# Patient Record
Sex: FEMALE | Race: WHITE | NOT HISPANIC OR LATINO | Employment: OTHER | URBAN - METROPOLITAN AREA
[De-identification: names, ages, dates, MRNs, and addresses within clinical notes are randomized per-mention and may not be internally consistent; named-entity substitution may affect disease eponyms.]

---

## 2018-03-07 ENCOUNTER — APPOINTMENT (OUTPATIENT)
Dept: PREADMISSION TESTING | Facility: HOSPITAL | Age: 68
End: 2018-03-07

## 2018-03-07 ENCOUNTER — PREP FOR SURGERY (OUTPATIENT)
Dept: OTHER | Facility: HOSPITAL | Age: 68
End: 2018-03-07

## 2018-03-07 VITALS — WEIGHT: 147 LBS | BODY MASS INDEX: 21.77 KG/M2 | HEIGHT: 69 IN

## 2018-03-07 DIAGNOSIS — N83.202 CYST OF LEFT OVARY: Primary | ICD-10-CM

## 2018-03-07 DIAGNOSIS — N83.202 CYST OF LEFT OVARY: ICD-10-CM

## 2018-03-07 LAB
ABO GROUP BLD: NORMAL
BLD GP AB SCN SERPL QL: NEGATIVE
RH BLD: POSITIVE

## 2018-03-07 PROCEDURE — 36415 COLL VENOUS BLD VENIPUNCTURE: CPT

## 2018-03-07 PROCEDURE — 86901 BLOOD TYPING SEROLOGIC RH(D): CPT | Performed by: OBSTETRICS & GYNECOLOGY

## 2018-03-07 PROCEDURE — 86850 RBC ANTIBODY SCREEN: CPT | Performed by: OBSTETRICS & GYNECOLOGY

## 2018-03-07 PROCEDURE — 86900 BLOOD TYPING SEROLOGIC ABO: CPT | Performed by: OBSTETRICS & GYNECOLOGY

## 2018-03-07 RX ORDER — IBUPROFEN 200 MG
600 TABLET ORAL EVERY 6 HOURS PRN
Status: ON HOLD | COMMUNITY
End: 2018-03-09

## 2018-03-07 RX ORDER — SODIUM CHLORIDE 0.9 % (FLUSH) 0.9 %
1-10 SYRINGE (ML) INJECTION AS NEEDED
Status: CANCELLED | OUTPATIENT
Start: 2018-03-07

## 2018-03-07 RX ORDER — ATORVASTATIN CALCIUM 10 MG/1
10 TABLET, FILM COATED ORAL NIGHTLY
COMMUNITY
End: 2018-08-29 | Stop reason: SDUPTHER

## 2018-03-08 ENCOUNTER — ANESTHESIA (OUTPATIENT)
Dept: PERIOP | Facility: HOSPITAL | Age: 68
End: 2018-03-08

## 2018-03-08 ENCOUNTER — ANESTHESIA EVENT (OUTPATIENT)
Dept: PERIOP | Facility: HOSPITAL | Age: 68
End: 2018-03-08

## 2018-03-08 ENCOUNTER — HOSPITAL ENCOUNTER (OUTPATIENT)
Facility: HOSPITAL | Age: 68
Discharge: HOME | End: 2018-03-09
Attending: OBSTETRICS & GYNECOLOGY | Admitting: OBSTETRICS & GYNECOLOGY

## 2018-03-08 DIAGNOSIS — N83.202 CYST OF LEFT OVARY: ICD-10-CM

## 2018-03-08 DIAGNOSIS — N94.89 ADNEXAL MASS: ICD-10-CM

## 2018-03-08 PROBLEM — N83.209 OVARIAN CYST: Status: ACTIVE | Noted: 2018-03-08

## 2018-03-08 LAB
ABO GROUP BLD: NORMAL
DEPRECATED RDW RBC AUTO: 45.4 FL (ref 37–54)
ERYTHROCYTE [DISTWIDTH] IN BLOOD BY AUTOMATED COUNT: 12.8 % (ref 11.3–14.5)
HCT VFR BLD AUTO: 40.1 % (ref 34.5–44)
HGB BLD-MCNC: 13 G/DL (ref 11.5–15.5)
MCH RBC QN AUTO: 31.4 PG (ref 27–31)
MCHC RBC AUTO-ENTMCNC: 32.4 G/DL (ref 32–36)
MCV RBC AUTO: 96.9 FL (ref 80–99)
PLATELET # BLD AUTO: 199 10*3/MM3 (ref 150–450)
PMV BLD AUTO: 9.4 FL (ref 6–12)
RBC # BLD AUTO: 4.14 10*6/MM3 (ref 3.89–5.14)
RH BLD: POSITIVE
WBC NRBC COR # BLD: 8.2 10*3/MM3 (ref 3.5–10.8)

## 2018-03-08 PROCEDURE — 25010000002 ONDANSETRON PER 1 MG: Performed by: NURSE ANESTHETIST, CERTIFIED REGISTERED

## 2018-03-08 PROCEDURE — 85027 COMPLETE CBC AUTOMATED: CPT | Performed by: OBSTETRICS & GYNECOLOGY

## 2018-03-08 PROCEDURE — A9270 NON-COVERED ITEM OR SERVICE: HCPCS | Performed by: OBSTETRICS & GYNECOLOGY

## 2018-03-08 PROCEDURE — A9270 NON-COVERED ITEM OR SERVICE: HCPCS | Performed by: ANESTHESIOLOGY

## 2018-03-08 PROCEDURE — 25010000002 ENOXAPARIN PER 10 MG: Performed by: OBSTETRICS & GYNECOLOGY

## 2018-03-08 PROCEDURE — 25010000002 PROPOFOL 1000 MG/ML EMULSION: Performed by: NURSE ANESTHETIST, CERTIFIED REGISTERED

## 2018-03-08 PROCEDURE — 63710000001 HYDROCODONE-ACETAMINOPHEN 10-325 MG TABLET: Performed by: OBSTETRICS & GYNECOLOGY

## 2018-03-08 PROCEDURE — 25010000002 PROPOFOL 10 MG/ML EMULSION: Performed by: NURSE ANESTHETIST, CERTIFIED REGISTERED

## 2018-03-08 PROCEDURE — 88305 TISSUE EXAM BY PATHOLOGIST: CPT | Performed by: OBSTETRICS & GYNECOLOGY

## 2018-03-08 PROCEDURE — 25010000002 DEXAMETHASONE PER 1 MG: Performed by: NURSE ANESTHETIST, CERTIFIED REGISTERED

## 2018-03-08 PROCEDURE — 25010000002 MIDAZOLAM PER 1 MG: Performed by: ANESTHESIOLOGY

## 2018-03-08 PROCEDURE — 25010000002 CEFOXITIN PER 1 G: Performed by: OBSTETRICS & GYNECOLOGY

## 2018-03-08 PROCEDURE — 63710000001 IBUPROFEN 600 MG TABLET: Performed by: OBSTETRICS & GYNECOLOGY

## 2018-03-08 PROCEDURE — 25010000002 HYDROMORPHONE PER 4 MG: Performed by: NURSE ANESTHETIST, CERTIFIED REGISTERED

## 2018-03-08 PROCEDURE — 25010000002 KETOROLAC TROMETHAMINE PER 15 MG: Performed by: OBSTETRICS & GYNECOLOGY

## 2018-03-08 PROCEDURE — 25010000002 FENTANYL CITRATE (PF) 100 MCG/2ML SOLUTION: Performed by: NURSE ANESTHETIST, CERTIFIED REGISTERED

## 2018-03-08 PROCEDURE — 86901 BLOOD TYPING SEROLOGIC RH(D): CPT

## 2018-03-08 PROCEDURE — 25010000002 NEOSTIGMINE PER 0.5 MG: Performed by: NURSE ANESTHETIST, CERTIFIED REGISTERED

## 2018-03-08 PROCEDURE — 63710000001 FAMOTIDINE 20 MG TABLET: Performed by: ANESTHESIOLOGY

## 2018-03-08 PROCEDURE — 88331 PATH CONSLTJ SURG 1 BLK 1SPC: CPT | Performed by: PATHOLOGY

## 2018-03-08 PROCEDURE — 86900 BLOOD TYPING SEROLOGIC ABO: CPT

## 2018-03-08 PROCEDURE — 88307 TISSUE EXAM BY PATHOLOGIST: CPT | Performed by: OBSTETRICS & GYNECOLOGY

## 2018-03-08 RX ORDER — LIDOCAINE HYDROCHLORIDE 10 MG/ML
INJECTION, SOLUTION INFILTRATION; PERINEURAL AS NEEDED
Status: DISCONTINUED | OUTPATIENT
Start: 2018-03-08 | End: 2018-03-08 | Stop reason: SURG

## 2018-03-08 RX ORDER — OXYCODONE AND ACETAMINOPHEN 7.5; 325 MG/1; MG/1
1 TABLET ORAL EVERY 4 HOURS PRN
Status: DISCONTINUED | OUTPATIENT
Start: 2018-03-08 | End: 2018-03-08

## 2018-03-08 RX ORDER — IBUPROFEN 600 MG/1
600 TABLET ORAL EVERY 6 HOURS PRN
Status: DISCONTINUED | OUTPATIENT
Start: 2018-03-08 | End: 2018-03-09 | Stop reason: HOSPADM

## 2018-03-08 RX ORDER — ATRACURIUM BESYLATE 10 MG/ML
INJECTION, SOLUTION INTRAVENOUS AS NEEDED
Status: DISCONTINUED | OUTPATIENT
Start: 2018-03-08 | End: 2018-03-08 | Stop reason: SURG

## 2018-03-08 RX ORDER — MAGNESIUM HYDROXIDE 1200 MG/15ML
LIQUID ORAL AS NEEDED
Status: DISCONTINUED | OUTPATIENT
Start: 2018-03-08 | End: 2018-03-08 | Stop reason: HOSPADM

## 2018-03-08 RX ORDER — PROMETHAZINE HYDROCHLORIDE 25 MG/1
25 SUPPOSITORY RECTAL ONCE AS NEEDED
Status: DISCONTINUED | OUTPATIENT
Start: 2018-03-08 | End: 2018-03-08 | Stop reason: HOSPADM

## 2018-03-08 RX ORDER — PROMETHAZINE HYDROCHLORIDE 25 MG/1
25 TABLET ORAL ONCE AS NEEDED
Status: DISCONTINUED | OUTPATIENT
Start: 2018-03-08 | End: 2018-03-08 | Stop reason: HOSPADM

## 2018-03-08 RX ORDER — ONDANSETRON 2 MG/ML
INJECTION INTRAMUSCULAR; INTRAVENOUS AS NEEDED
Status: DISCONTINUED | OUTPATIENT
Start: 2018-03-08 | End: 2018-03-08 | Stop reason: SURG

## 2018-03-08 RX ORDER — SODIUM CHLORIDE, SODIUM LACTATE, POTASSIUM CHLORIDE, CALCIUM CHLORIDE 600; 310; 30; 20 MG/100ML; MG/100ML; MG/100ML; MG/100ML
9 INJECTION, SOLUTION INTRAVENOUS CONTINUOUS
Status: DISCONTINUED | OUTPATIENT
Start: 2018-03-08 | End: 2018-03-09 | Stop reason: HOSPADM

## 2018-03-08 RX ORDER — BUPIVACAINE HYDROCHLORIDE 5 MG/ML
INJECTION, SOLUTION PERINEURAL AS NEEDED
Status: DISCONTINUED | OUTPATIENT
Start: 2018-03-08 | End: 2018-03-08 | Stop reason: HOSPADM

## 2018-03-08 RX ORDER — ONDANSETRON 2 MG/ML
4 INJECTION INTRAMUSCULAR; INTRAVENOUS ONCE AS NEEDED
Status: COMPLETED | OUTPATIENT
Start: 2018-03-08 | End: 2018-03-08

## 2018-03-08 RX ORDER — HYDROMORPHONE HYDROCHLORIDE 1 MG/ML
0.5 INJECTION, SOLUTION INTRAMUSCULAR; INTRAVENOUS; SUBCUTANEOUS
Status: DISCONTINUED | OUTPATIENT
Start: 2018-03-08 | End: 2018-03-08 | Stop reason: HOSPADM

## 2018-03-08 RX ORDER — SODIUM CHLORIDE, SODIUM LACTATE, POTASSIUM CHLORIDE, CALCIUM CHLORIDE 600; 310; 30; 20 MG/100ML; MG/100ML; MG/100ML; MG/100ML
100 INJECTION, SOLUTION INTRAVENOUS CONTINUOUS
Status: DISCONTINUED | OUTPATIENT
Start: 2018-03-08 | End: 2018-03-09 | Stop reason: HOSPADM

## 2018-03-08 RX ORDER — PROMETHAZINE HYDROCHLORIDE 25 MG/ML
6.25 INJECTION, SOLUTION INTRAMUSCULAR; INTRAVENOUS ONCE AS NEEDED
Status: DISCONTINUED | OUTPATIENT
Start: 2018-03-08 | End: 2018-03-08 | Stop reason: HOSPADM

## 2018-03-08 RX ORDER — SODIUM CHLORIDE 0.9 % (FLUSH) 0.9 %
1-10 SYRINGE (ML) INJECTION AS NEEDED
Status: DISCONTINUED | OUTPATIENT
Start: 2018-03-08 | End: 2018-03-08 | Stop reason: HOSPADM

## 2018-03-08 RX ORDER — ONDANSETRON 2 MG/ML
4 INJECTION INTRAMUSCULAR; INTRAVENOUS EVERY 6 HOURS PRN
Status: DISCONTINUED | OUTPATIENT
Start: 2018-03-08 | End: 2018-03-09 | Stop reason: HOSPADM

## 2018-03-08 RX ORDER — PROPOFOL 10 MG/ML
VIAL (ML) INTRAVENOUS AS NEEDED
Status: DISCONTINUED | OUTPATIENT
Start: 2018-03-08 | End: 2018-03-08 | Stop reason: SURG

## 2018-03-08 RX ORDER — DEXAMETHASONE SODIUM PHOSPHATE 4 MG/ML
INJECTION, SOLUTION INTRA-ARTICULAR; INTRALESIONAL; INTRAMUSCULAR; INTRAVENOUS; SOFT TISSUE AS NEEDED
Status: DISCONTINUED | OUTPATIENT
Start: 2018-03-08 | End: 2018-03-08 | Stop reason: SURG

## 2018-03-08 RX ORDER — FENTANYL CITRATE 50 UG/ML
50 INJECTION, SOLUTION INTRAMUSCULAR; INTRAVENOUS
Status: DISCONTINUED | OUTPATIENT
Start: 2018-03-08 | End: 2018-03-08 | Stop reason: HOSPADM

## 2018-03-08 RX ORDER — HYDROCODONE BITARTRATE AND ACETAMINOPHEN 10; 325 MG/1; MG/1
1 TABLET ORAL EVERY 4 HOURS PRN
Status: DISCONTINUED | OUTPATIENT
Start: 2018-03-08 | End: 2018-03-09 | Stop reason: HOSPADM

## 2018-03-08 RX ORDER — MIDAZOLAM HYDROCHLORIDE 1 MG/ML
1 INJECTION INTRAMUSCULAR; INTRAVENOUS ONCE
Status: COMPLETED | OUTPATIENT
Start: 2018-03-08 | End: 2018-03-08

## 2018-03-08 RX ORDER — GLYCOPYRROLATE 0.2 MG/ML
INJECTION INTRAMUSCULAR; INTRAVENOUS AS NEEDED
Status: DISCONTINUED | OUTPATIENT
Start: 2018-03-08 | End: 2018-03-08 | Stop reason: SURG

## 2018-03-08 RX ORDER — FENTANYL CITRATE 50 UG/ML
INJECTION, SOLUTION INTRAMUSCULAR; INTRAVENOUS AS NEEDED
Status: DISCONTINUED | OUTPATIENT
Start: 2018-03-08 | End: 2018-03-08 | Stop reason: SURG

## 2018-03-08 RX ORDER — FAMOTIDINE 20 MG/1
20 TABLET, FILM COATED ORAL ONCE
Status: COMPLETED | OUTPATIENT
Start: 2018-03-08 | End: 2018-03-08

## 2018-03-08 RX ORDER — SODIUM CHLORIDE 9 MG/ML
INJECTION, SOLUTION INTRAVENOUS AS NEEDED
Status: DISCONTINUED | OUTPATIENT
Start: 2018-03-08 | End: 2018-03-08 | Stop reason: HOSPADM

## 2018-03-08 RX ORDER — ONDANSETRON 4 MG/1
4 TABLET, FILM COATED ORAL EVERY 6 HOURS PRN
Status: DISCONTINUED | OUTPATIENT
Start: 2018-03-08 | End: 2018-03-09 | Stop reason: HOSPADM

## 2018-03-08 RX ORDER — KETOROLAC TROMETHAMINE 30 MG/ML
15 INJECTION, SOLUTION INTRAMUSCULAR; INTRAVENOUS EVERY 6 HOURS PRN
Status: DISCONTINUED | OUTPATIENT
Start: 2018-03-08 | End: 2018-03-09 | Stop reason: HOSPADM

## 2018-03-08 RX ADMIN — SODIUM CHLORIDE, POTASSIUM CHLORIDE, SODIUM LACTATE AND CALCIUM CHLORIDE 100 ML/HR: 600; 310; 30; 20 INJECTION, SOLUTION INTRAVENOUS at 17:49

## 2018-03-08 RX ADMIN — LIDOCAINE HYDROCHLORIDE 50 MG: 10 INJECTION, SOLUTION INFILTRATION; PERINEURAL at 11:32

## 2018-03-08 RX ADMIN — ENOXAPARIN SODIUM 40 MG: 100 INJECTION SUBCUTANEOUS at 20:55

## 2018-03-08 RX ADMIN — PROPOFOL 25 MCG/KG/MIN: 10 INJECTION, EMULSION INTRAVENOUS at 11:35

## 2018-03-08 RX ADMIN — IBUPROFEN 600 MG: 600 TABLET, FILM COATED ORAL at 23:00

## 2018-03-08 RX ADMIN — ONDANSETRON 4 MG: 2 INJECTION INTRAMUSCULAR; INTRAVENOUS at 13:25

## 2018-03-08 RX ADMIN — ATRACURIUM BESYLATE 40 MG: 10 INJECTION, SOLUTION INTRAVENOUS at 11:32

## 2018-03-08 RX ADMIN — GLYCOPYRROLATE 0.6 MG: 0.2 INJECTION, SOLUTION INTRAMUSCULAR; INTRAVENOUS at 12:25

## 2018-03-08 RX ADMIN — HYDROMORPHONE HYDROCHLORIDE 0.5 MG: 10 INJECTION INTRAMUSCULAR; INTRAVENOUS; SUBCUTANEOUS at 12:50

## 2018-03-08 RX ADMIN — DEXAMETHASONE SODIUM PHOSPHATE 8 MG: 4 INJECTION, SOLUTION INTRAMUSCULAR; INTRAVENOUS at 11:39

## 2018-03-08 RX ADMIN — FENTANYL CITRATE 100 MCG: 50 INJECTION, SOLUTION INTRAMUSCULAR; INTRAVENOUS at 11:32

## 2018-03-08 RX ADMIN — HYDROMORPHONE HYDROCHLORIDE 0.5 MG: 10 INJECTION INTRAMUSCULAR; INTRAVENOUS; SUBCUTANEOUS at 13:00

## 2018-03-08 RX ADMIN — FAMOTIDINE 20 MG: 20 TABLET, FILM COATED ORAL at 08:35

## 2018-03-08 RX ADMIN — Medication 4 MG: at 12:25

## 2018-03-08 RX ADMIN — ATRACURIUM BESYLATE 10 MG: 10 INJECTION, SOLUTION INTRAVENOUS at 11:55

## 2018-03-08 RX ADMIN — SODIUM CHLORIDE, POTASSIUM CHLORIDE, SODIUM LACTATE AND CALCIUM CHLORIDE 9 ML/HR: 600; 310; 30; 20 INJECTION, SOLUTION INTRAVENOUS at 08:35

## 2018-03-08 RX ADMIN — KETOROLAC TROMETHAMINE 15 MG: 30 INJECTION, SOLUTION INTRAMUSCULAR at 17:48

## 2018-03-08 RX ADMIN — PROPOFOL 150 MG: 10 INJECTION, EMULSION INTRAVENOUS at 11:32

## 2018-03-08 RX ADMIN — HYDROCODONE BITARTRATE AND ACETAMINOPHEN 1 TABLET: 10; 325 TABLET ORAL at 17:45

## 2018-03-08 RX ADMIN — GLYCOPYRROLATE 0.2 MG: 0.2 INJECTION, SOLUTION INTRAMUSCULAR; INTRAVENOUS at 11:58

## 2018-03-08 RX ADMIN — SODIUM CHLORIDE, POTASSIUM CHLORIDE, SODIUM LACTATE AND CALCIUM CHLORIDE 9 ML/HR: 600; 310; 30; 20 INJECTION, SOLUTION INTRAVENOUS at 12:51

## 2018-03-08 RX ADMIN — FENTANYL CITRATE 50 MCG: 50 INJECTION INTRAMUSCULAR; INTRAVENOUS at 13:28

## 2018-03-08 RX ADMIN — ONDANSETRON 4 MG: 2 INJECTION INTRAMUSCULAR; INTRAVENOUS at 12:21

## 2018-03-08 RX ADMIN — MIDAZOLAM HYDROCHLORIDE 1 MG: 1 INJECTION, SOLUTION INTRAMUSCULAR; INTRAVENOUS at 09:00

## 2018-03-08 RX ADMIN — CEFOXITIN 2 G: 2 INJECTION, POWDER, FOR SOLUTION INTRAVENOUS at 11:27

## 2018-03-08 NOTE — ANESTHESIA PROCEDURE NOTES
Airway  Urgency: elective    Date/Time: 3/8/2018 11:34 AM  Airway not difficult    General Information and Staff    Patient location during procedure: OR  CRNA: JUVENTINO ELLIS    Indications and Patient Condition  Indications for airway management: airway protection    Preoxygenated: yes  MILS not maintained throughout  Mask difficulty assessment: 1 - vent by mask    Final Airway Details  Final airway type: endotracheal airway      Successful airway: ETT  Cuffed: yes   Successful intubation technique: direct laryngoscopy  Facilitating devices/methods: intubating stylet  Endotracheal tube insertion site: oral  Blade: Josefina  Blade size: #3  ETT size: 7.0 mm  Cormack-Lehane Classification: grade IIb - view of arytenoids or posterior of glottis only  Placement verified by: chest auscultation and capnometry   Cuff volume (mL): 7  Measured from: teeth  ETT to teeth (cm): 21  Number of attempts at approach: 1    Additional Comments  Smooth IV induction.  Atraumatic ET intubation.  Dentition unchanged.  Negative epigastric sounds, Breath sound equal bilaterally with symmetric chest rise and fall

## 2018-03-08 NOTE — OP NOTE
OVARIAN CYST LAPAROSCOPIC DRAINAGE/EXCISION  Procedure Note    Penny Delvalle  3/8/2018    Pre-op Diagnosis: complex left adnexa mass        post-op Diagnosis:      bilateral left adnexal masses, frozen section showed bengn ovaria fibromas adenomatous change    Procedure(s):  LAPAROSCOPIC REMOVAL OF BILATERAL TUBES AND OVARIES      Surgeon(s):  MD Umberto Ball MD Randal Wilson Owen, MD    Anesthesia: General    Staff:   Circulator: Nisreen Murcia RN; Antwan Ortiz RN; Bishnu Galvez RN  Scrub Person: Ana Levine; Maryjo Carbajal    Estimated Blood Loss: 20 mL    Specimens:                  Order Name Source Comment Collection Info Order Time   ABO/RH SPECIMEN VERIFICATION PREOP   Collected By: Onelia Ann RN 3/8/2018  8:22 AM   TISSUE PATHOLOGY EXAM Ovary, Right  Collected By: Giovanny Pruitt MD 3/8/2018 12:01 PM         Drains:   Urethral Catheter 03/08/18 1130 100% silicone 16 10 10 (Active)           Indications:   Complex adenexal mass    Findings:  Benign ovarian fibromas    Operative procedure:  diagnosic laparoscopy, bilateral salpingectomy  bilateral oophrectomy      Giovanny Pruitt MD     Date: 3/8/2018  Time: 12:55 PM

## 2018-03-08 NOTE — PLAN OF CARE
Problem: Pain, Acute (Adult)  Goal: Identify Related Risk Factors and Signs and Symptoms  Outcome: Ongoing (interventions implemented as appropriate)   03/08/18 1731   Pain, Acute   Related Risk Factors (Acute Pain) communication barrier;environmental exposure;infection;knowledge deficit;patient perception;persistent pain;procedure/treatment;psychosocial factor;surgery   Signs and Symptoms (Acute Pain) alteration in muscle tone;BADLs/IADLs reluctance/inability to perform;fatigue/weakness;guarding/abnormal posturing/positioning     Goal: Acceptable Pain Control/Comfort Level  Outcome: Ongoing (interventions implemented as appropriate)   03/08/18 1731   Pain, Acute (Adult)   Acceptable Pain Control/Comfort Level making progress toward outcome

## 2018-03-08 NOTE — INTERVAL H&P NOTE
H&P reviewed. The patient was examined and there are no changes to the H&P.       Heart: RRR  Lungs: CTAB    Immunizations:    Tetanus: Unknown     Influenza: 2017     Pneumo: No    Labs were reviewed.       Plan: Lap removal of left adnexal mass on left ovary

## 2018-03-08 NOTE — ANESTHESIA PREPROCEDURE EVALUATION
" Anesthesia Evaluation     Patient summary reviewed and Nursing notes reviewed   history of anesthetic complications: PONV  NPO Solid Status: > 8 hours  NPO Liquid Status: > 8 hours           Airway   Mallampati: II  TM distance: >3 FB  Neck ROM: full  No difficulty expected  Dental      Pulmonary    (-) COPD, asthma, not a smoker, pulmonary embolism, lung cancer  Cardiovascular     (+) hyperlipidemia,   (-) hypertension, valvular problems/murmurs, past MI, CAD, dysrhythmias, cardiac stents      Neuro/Psych  (-) seizures, CVANeuromuscular disease: \" global amnesia \"  single episode  5 years ago  No work up -  no rediual   GI/Hepatic/Renal/Endo    (+)   renal disease stones,   (-) hepatitis, liver disease, diabetes, hypothyroidism, GI bleed    ROS Comment: Perf Colon after colonoscopy last year    Free air no surgery     Musculoskeletal     Abdominal    Substance History      OB/GYN          Other   (+) arthritis                   Anesthesia Plan    ASA 2     general   (Propofol Infusion as part of Anti PONV tech   Versed for anxiety in Pre op )  intravenous induction   Anesthetic plan and risks discussed with patient.    Plan discussed with CRNA.      "

## 2018-03-08 NOTE — OP NOTE
PREOPERATIVE DIAGNOSIS:  Left complex adnexal mass.     POSTOPERATIVE DIAGNOSES:  1.  Left complex adnexal mass.  2.  Bilateral complex masses on the right and left adnexa.    PROCEDURES PERFORMED:  1.  Diagnostic laparoscopy with bilateral salpingo-oophorectomy.  2.  Frozen section right after removal of the masses.     SURGEON:  Giovanny Pruitt MD    ANESTHESIA:  General.    COMPLICATIONS:  None.     ESTIMATED BLOOD LOSS:  Less than 50 mL.    FINDINGS:  Frozen section showed bilateral benign ovarian fibromas with cystic changes and adenomatous changes.    No signs of malignancy. It appeared that she had a previous ectopic on that right tube.  Part of the tube seemed to be missing.      DESCRIPTION OF PROCEDURE:  The patient was taken to the operating room and placed under general anesthesia.  She was sterilely draped and prepped. Exam under anesthesia revealed a mobile uterus with some fullness in the posterior cul-de-sac. A periumbilical incision was made and the 5 mm trocar and scope were placed under direct visualization without complications.  CO2 was insufflated. Out on the right lateral side we placed another 5 mm trocar and in the suprapubic area placed a 12 mm nonbladed trocar at midline. We were able to elevate the uterus in the midline using a sponge stick vaginally.  Each adnexa was inspected. We were expecting a large complex in the left adnexa which was present.  There were also some cystic and fibrotic projections on the right ovary also. We decided at that time to do a bilateral salpingo-oophorectomy and frozen sections. Using the Harmonic scalpel we were able to elevate the tube and ovary on the right side and we took the IP ligament and coming up under the tube and ovary across the utero-ovarian ligament and across the attachment of the tube at the cornua. We were able to completely excise the tube and ovary.  We were able to visualize the ureter well away from this area.  We then moved over to the  left side. This was a little larger complex.  The complex on the left was probably 7 x 5 cm.  The complex on the right was probably 4 x 5 cm.  Again, we were able to elevate that left ovarian complex and the left tube and were able to and take down the IP ligament and then move up under the tube and ovary including the mesosalpinx and across the utero-ovarian ligament.  We were able to completely excise the tube and ovary and they were also sent for frozen section.  Frozen section came back benign ovarian fibromas with cystic and adenomatous changes.  All areas were irrigated and inspected. The ureters were well out of the way.  Good hemostasis was noted. We had several minutes to wait on the frozen sections and there was no sign of bleeding at that time. The mid and upper abdomen were inspected with the scope and there was no sign of obvious abnormalities. All instruments were removed. CO2 was leaked off.  The incisions were closed with interrupted subcuticular stitches of 3-0 Vicryl. They were reinforced with Steri-Strips and Mastisol.  Sterile dressings were applied over the 3 incisions.  The patient will stay overnight and probably go home in the morning.

## 2018-03-09 VITALS
HEIGHT: 69 IN | RESPIRATION RATE: 17 BRPM | HEART RATE: 62 BPM | OXYGEN SATURATION: 96 % | WEIGHT: 140 LBS | SYSTOLIC BLOOD PRESSURE: 101 MMHG | DIASTOLIC BLOOD PRESSURE: 58 MMHG | TEMPERATURE: 98 F | BODY MASS INDEX: 20.73 KG/M2

## 2018-03-09 LAB
CYTO UR: NORMAL
DEPRECATED RDW RBC AUTO: 45.5 FL (ref 37–54)
ERYTHROCYTE [DISTWIDTH] IN BLOOD BY AUTOMATED COUNT: 13 % (ref 11.3–14.5)
HCT VFR BLD AUTO: 35.4 % (ref 34.5–44)
HGB BLD-MCNC: 11.4 G/DL (ref 11.5–15.5)
LAB AP CASE REPORT: NORMAL
LAB AP CLINICAL INFORMATION: NORMAL
LAB AP DIAGNOSIS COMMENT: NORMAL
Lab: NORMAL
Lab: NORMAL
MCH RBC QN AUTO: 31 PG (ref 27–31)
MCHC RBC AUTO-ENTMCNC: 32.2 G/DL (ref 32–36)
MCV RBC AUTO: 96.2 FL (ref 80–99)
PATH REPORT.FINAL DX SPEC: NORMAL
PATH REPORT.GROSS SPEC: NORMAL
PLATELET # BLD AUTO: 196 10*3/MM3 (ref 150–450)
PMV BLD AUTO: 10.1 FL (ref 6–12)
RBC # BLD AUTO: 3.68 10*6/MM3 (ref 3.89–5.14)
WBC NRBC COR # BLD: 7.63 10*3/MM3 (ref 3.5–10.8)

## 2018-03-09 PROCEDURE — A9270 NON-COVERED ITEM OR SERVICE: HCPCS | Performed by: OBSTETRICS & GYNECOLOGY

## 2018-03-09 PROCEDURE — 63710000001 HYDROCODONE-ACETAMINOPHEN 10-325 MG TABLET: Performed by: OBSTETRICS & GYNECOLOGY

## 2018-03-09 PROCEDURE — 85027 COMPLETE CBC AUTOMATED: CPT | Performed by: OBSTETRICS & GYNECOLOGY

## 2018-03-09 RX ORDER — ONDANSETRON 4 MG/1
4 TABLET, FILM COATED ORAL EVERY 8 HOURS PRN
Qty: 20 TABLET | Refills: 0 | Status: SHIPPED | OUTPATIENT
Start: 2018-03-09 | End: 2021-02-03

## 2018-03-09 RX ORDER — HYDROCODONE BITARTRATE AND ACETAMINOPHEN 10; 325 MG/1; MG/1
1 TABLET ORAL EVERY 4 HOURS PRN
Qty: 20 TABLET | Refills: 0 | Status: SHIPPED | OUTPATIENT
Start: 2018-03-09 | End: 2018-03-29

## 2018-03-09 RX ORDER — IBUPROFEN 600 MG/1
600 TABLET ORAL EVERY 6 HOURS PRN
Qty: 30 TABLET | Refills: 0 | Status: SHIPPED | OUTPATIENT
Start: 2018-03-09 | End: 2019-09-11 | Stop reason: ALTCHOICE

## 2018-03-09 RX ADMIN — HYDROCODONE BITARTRATE AND ACETAMINOPHEN 1 TABLET: 10; 325 TABLET ORAL at 05:54

## 2018-03-09 NOTE — PLAN OF CARE
Problem: Patient Care Overview (Adult)  Goal: Plan of Care Review  Outcome: Ongoing (interventions implemented as appropriate)   03/09/18 0720   Coping/Psychosocial Response Interventions   Plan Of Care Reviewed With patient   Patient Care Overview   Progress progress toward functional goals as expected   Outcome Evaluation   Outcome Summary/Follow up Plan pt c/o twice throughout shift. prn po meds given. vss. good uo. expected dc today.      Goal: Adult Individualization and Mutuality  Outcome: Ongoing (interventions implemented as appropriate)    Goal: Discharge Needs Assessment  Outcome: Ongoing (interventions implemented as appropriate)      Problem: Pain, Acute (Adult)  Goal: Identify Related Risk Factors and Signs and Symptoms  Outcome: Ongoing (interventions implemented as appropriate)    Goal: Acceptable Pain Control/Comfort Level  Outcome: Ongoing (interventions implemented as appropriate)

## 2018-03-09 NOTE — DISCHARGE SUMMARY
FINAL DIAGNOSIS:  Bilateral ovarian masses. Frozen section during surgery showed bilateral cystic fibromas on both ovaries.  They were all benign changes, no sign of malignancy.       PROCEDURES:  Principal procedure during hospital stay was a laparoscopy with a bilateral salpingo-oophorectomy.    HOSPITAL COURSE: The patient was watched overnight.  Her hematocrit is stable.  She is tolerating meals this morning.     DISCHARGE MEDICATIONS:   1.  Hydrocodone 10 mg q.4 h., p.r.n., pain.   2.  Motrin 600 mg q.8 h., p.r.n. Pain.    3.  Zofran 4 mg t.i.d., p.r.n. nausea and vomiting.      DISCHARGE INSTRUCTIONS:  The patient will see us back in our office in approximately 1 week.  She knows to call if she has high fever, extreme pain, or any heavy bleeding.

## 2018-08-29 ENCOUNTER — OFFICE VISIT (OUTPATIENT)
Dept: FAMILY MEDICINE CLINIC | Facility: CLINIC | Age: 68
End: 2018-08-29

## 2018-08-29 VITALS
RESPIRATION RATE: 18 BRPM | DIASTOLIC BLOOD PRESSURE: 62 MMHG | OXYGEN SATURATION: 98 % | WEIGHT: 144 LBS | HEART RATE: 73 BPM | BODY MASS INDEX: 21.33 KG/M2 | SYSTOLIC BLOOD PRESSURE: 102 MMHG | HEIGHT: 69 IN | TEMPERATURE: 97.5 F

## 2018-08-29 DIAGNOSIS — E78.49 OTHER HYPERLIPIDEMIA: Primary | ICD-10-CM

## 2018-08-29 DIAGNOSIS — Z11.59 NEED FOR HEPATITIS C SCREENING TEST: ICD-10-CM

## 2018-08-29 DIAGNOSIS — Z12.39 SCREENING FOR BREAST CANCER: ICD-10-CM

## 2018-08-29 PROCEDURE — 99203 OFFICE O/P NEW LOW 30 MIN: CPT | Performed by: FAMILY MEDICINE

## 2018-08-29 RX ORDER — ATORVASTATIN CALCIUM 10 MG/1
10 TABLET, FILM COATED ORAL NIGHTLY
Qty: 90 TABLET | Refills: 1 | Status: SHIPPED | OUTPATIENT
Start: 2018-08-29 | End: 2019-08-12 | Stop reason: SDUPTHER

## 2018-08-29 NOTE — PROGRESS NOTES
Subjective   Penny Delvalle is a 68 y.o. female.   Chief Complaint   Patient presents with   • [facility]     New patient      History of Present Illness   Previous PCP [facility].   She has brought records with her, most recent labs completed Sept 2017.   Mammogram: overdue, she would like to complete.   She had bilateral salpingo-oophorectomy per Dr. Giovanny Pruitt, March 2018.     History of hyperlipidemia: This is a chronic condition. She was initially treated with simvastatin, but this caused hair loss. She is now treated with atorvastatin 10 mg daily, no side effects. Most recent lipid panel completed Sept 2017.     The following portions of the patient's history were reviewed and updated as appropriate: allergies, current medications, past family history, past medical history, past social history, past surgical history and problem list.    Review of Systems   Constitutional: Negative for chills, fatigue and fever.   HENT: Positive for postnasal drip and voice change (hoarse voice, improving ). Negative for congestion, hearing loss and trouble swallowing.    Eyes: Negative for pain and visual disturbance.   Respiratory: Negative for cough, chest tightness, shortness of breath and wheezing.    Cardiovascular: Negative for chest pain, palpitations and leg swelling.   Gastrointestinal: Negative for abdominal pain, blood in stool, nausea and vomiting.   Endocrine: Negative for cold intolerance and heat intolerance.   Genitourinary: Negative for dysuria and hematuria.   Musculoskeletal: Negative for gait problem.   Skin: Negative for rash.   Neurological: Negative for weakness and confusion.   Hematological: Negative for adenopathy. Does not bruise/bleed easily.   Psychiatric/Behavioral: Negative for agitation, self-injury, suicidal ideas and depressed mood. The patient is not nervous/anxious.        Objective   Physical Exam   Constitutional: She is oriented to person, place, and time. She appears  well-developed and well-nourished.   HENT:   Head: Normocephalic and atraumatic.   Right Ear: External ear normal.   Left Ear: External ear normal.   Nose: Nose normal.   Eyes: Conjunctivae are normal.   Neck: Normal range of motion. Neck supple.   Cardiovascular: Normal rate, regular rhythm and normal heart sounds.    No murmur heard.  Pulmonary/Chest: Effort normal and breath sounds normal. She has no wheezes.   Musculoskeletal: She exhibits no edema or deformity.   Neurological: She is alert and oriented to person, place, and time.   Skin: Skin is warm and dry.   Psychiatric: She has a normal mood and affect. Her behavior is normal.   Nursing note and vitals reviewed.        Assessment/Plan   Penny was seen today for establish care.    Diagnoses and all orders for this visit:    Other hyperlipidemia  -     Comprehensive Metabolic Panel; Future  -     CBC & Differential; Future  -     Lipid Panel; Future  -     atorvastatin (LIPITOR) 10 MG tablet; Take 1 tablet by mouth Every Night.    Need for hepatitis C screening test  -     Hepatitis C Antibody; Future    Screening for breast cancer  -     Mammo Screening Digital Tomosynthesis Bilateral With CAD      She will return fasting to complete labs.   Screening mammogram ordered.   Discussed with her about updating vaccinations, including Prevnar, Pneumovax, Tdap, and Shingrix. She is aware that she will need to complete Tdap and Shingrix at the pharmacy, however prefers to research information about all vaccinations before proceeding with treatment. We will discuss starting pneumonia vaccinations at her follow up visit.

## 2018-08-29 NOTE — PATIENT INSTRUCTIONS
Go to the nearest ER or return to clinic if symptoms worsen, fever/chill develop  Pneumococcal Conjugate Vaccine suspension for injection  What is this medicine?  PNEUMOCOCCAL VACCINE (ARACELI mo PHUC al vak SEEN) is a vaccine used to prevent pneumococcus bacterial infections. These bacteria can cause serious infections like pneumonia, meningitis, and blood infections. This vaccine will lower your chance of getting pneumonia. If you do get pneumonia, it can make your symptoms milder and your illness shorter. This vaccine will not treat an infection and will not cause infection. This vaccine is recommended for infants and young children, adults with certain medical conditions, and adults 65 years or older.  This medicine may be used for other purposes; ask your health care provider or pharmacist if you have questions.  COMMON BRAND NAME(S): Prevnar, Prevnar 13  What should I tell my health care provider before I take this medicine?  They need to know if you have any of these conditions:  -bleeding problems  -fever  -immune system problems  -an unusual or allergic reaction to pneumococcal vaccine, diphtheria toxoid, other vaccines, latex, other medicines, foods, dyes, or preservatives  -pregnant or trying to get pregnant  -breast-feeding  How should I use this medicine?  This vaccine is for injection into a muscle. It is given by a health care professional.  A copy of Vaccine Information Statements will be given before each vaccination. Read this sheet carefully each time. The sheet may change frequently.  Talk to your pediatrician regarding the use of this medicine in children. While this drug may be prescribed for children as young as 6 weeks old for selected conditions, precautions do apply.  Overdosage: If you think you have taken too much of this medicine contact a poison control center or emergency room at once.  NOTE: This medicine is only for you. Do not share this medicine with others.  What if I miss a dose?  It  is important not to miss your dose. Call your doctor or health care professional if you are unable to keep an appointment.  What may interact with this medicine?  -medicines for cancer chemotherapy  -medicines that suppress your immune function  -steroid medicines like prednisone or cortisone  This list may not describe all possible interactions. Give your health care provider a list of all the medicines, herbs, non-prescription drugs, or dietary supplements you use. Also tell them if you smoke, drink alcohol, or use illegal drugs. Some items may interact with your medicine.  What should I watch for while using this medicine?  Mild fever and pain should go away in 3 days or less. Report any unusual symptoms to your doctor or health care professional.  What side effects may I notice from receiving this medicine?  Side effects that you should report to your doctor or health care professional as soon as possible:  -allergic reactions like skin rash, itching or hives, swelling of the face, lips, or tongue  -breathing problems  -confused  -fast or irregular heartbeat  -fever over 102 degrees F  -seizures  -unusual bleeding or bruising  -unusual muscle weakness  Side effects that usually do not require medical attention (report to your doctor or health care professional if they continue or are bothersome):  -aches and pains  -diarrhea  -fever of 102 degrees F or less  -headache  -irritable  -loss of appetite  -pain, tender at site where injected  -trouble sleeping  This list may not describe all possible side effects. Call your doctor for medical advice about side effects. You may report side effects to FDA at 5-877-FDA-5828.  Where should I keep my medicine?  This does not apply. This vaccine is given in a clinic, pharmacy, doctor's office, or other health care setting and will not be stored at home.  NOTE: This sheet is a summary. It may not cover all possible information. If you have questions about this medicine, talk  to your doctor, pharmacist, or health care provider.  © 2018 [employer] (2015-09-24 10:27:27)

## 2018-09-12 ENCOUNTER — APPOINTMENT (OUTPATIENT)
Dept: OTHER | Facility: HOSPITAL | Age: 68
End: 2018-09-12
Attending: FAMILY MEDICINE

## 2018-09-12 ENCOUNTER — RESULTS ENCOUNTER (OUTPATIENT)
Dept: FAMILY MEDICINE CLINIC | Facility: CLINIC | Age: 68
End: 2018-09-12

## 2018-09-12 ENCOUNTER — HOSPITAL ENCOUNTER (OUTPATIENT)
Dept: MAMMOGRAPHY | Facility: HOSPITAL | Age: 68
Discharge: HOME | End: 2018-09-12
Attending: FAMILY MEDICINE | Admitting: FAMILY MEDICINE

## 2018-09-12 DIAGNOSIS — Z11.59 NEED FOR HEPATITIS C SCREENING TEST: ICD-10-CM

## 2018-09-12 DIAGNOSIS — E78.49 OTHER HYPERLIPIDEMIA: ICD-10-CM

## 2018-09-12 DIAGNOSIS — Z12.39 SCREENING FOR BREAST CANCER: ICD-10-CM

## 2018-09-12 PROCEDURE — 77067 SCR MAMMO BI INCL CAD: CPT | Performed by: RADIOLOGY

## 2018-09-12 PROCEDURE — 77063 BREAST TOMOSYNTHESIS BI: CPT

## 2018-09-12 PROCEDURE — 77063 BREAST TOMOSYNTHESIS BI: CPT | Performed by: RADIOLOGY

## 2018-09-12 PROCEDURE — 77067 SCR MAMMO BI INCL CAD: CPT

## 2019-08-12 DIAGNOSIS — E78.49 OTHER HYPERLIPIDEMIA: ICD-10-CM

## 2019-08-13 RX ORDER — ATORVASTATIN CALCIUM 10 MG/1
10 TABLET, FILM COATED ORAL NIGHTLY
Qty: 90 TABLET | Refills: 0 | Status: SHIPPED | OUTPATIENT
Start: 2019-08-13 | End: 2019-12-09 | Stop reason: SDUPTHER

## 2019-08-15 ENCOUNTER — OFFICE VISIT (OUTPATIENT)
Dept: FAMILY MEDICINE CLINIC | Facility: CLINIC | Age: 69
End: 2019-08-15

## 2019-08-15 VITALS
BODY MASS INDEX: 22.51 KG/M2 | WEIGHT: 152 LBS | SYSTOLIC BLOOD PRESSURE: 112 MMHG | TEMPERATURE: 97.5 F | HEIGHT: 69 IN | DIASTOLIC BLOOD PRESSURE: 76 MMHG | HEART RATE: 78 BPM | RESPIRATION RATE: 18 BRPM

## 2019-08-15 DIAGNOSIS — Z00.00 MEDICARE ANNUAL WELLNESS VISIT, INITIAL: ICD-10-CM

## 2019-08-15 DIAGNOSIS — Z13.29 SCREENING FOR THYROID DISORDER: ICD-10-CM

## 2019-08-15 DIAGNOSIS — Z11.59 NEED FOR HEPATITIS C SCREENING TEST: ICD-10-CM

## 2019-08-15 DIAGNOSIS — E78.49 OTHER HYPERLIPIDEMIA: Primary | ICD-10-CM

## 2019-08-15 DIAGNOSIS — Z90.79 HISTORY OF SALPINGOOPHORECTOMY: ICD-10-CM

## 2019-08-15 DIAGNOSIS — Z78.0 POSTMENOPAUSAL: ICD-10-CM

## 2019-08-15 DIAGNOSIS — Z23 NEED FOR VACCINATION FOR STREP PNEUMONIAE: ICD-10-CM

## 2019-08-15 DIAGNOSIS — Z90.721 HISTORY OF SALPINGOOPHORECTOMY: ICD-10-CM

## 2019-08-15 PROCEDURE — G0009 ADMIN PNEUMOCOCCAL VACCINE: HCPCS | Performed by: FAMILY MEDICINE

## 2019-08-15 PROCEDURE — 90670 PCV13 VACCINE IM: CPT | Performed by: FAMILY MEDICINE

## 2019-08-15 PROCEDURE — G0438 PPPS, INITIAL VISIT: HCPCS | Performed by: FAMILY MEDICINE

## 2019-08-15 NOTE — PROGRESS NOTES
The ABCs of the Annual Wellness Visit  Initial Medicare Wellness Visit    Chief Complaint   Patient presents with   • Annual Exam     Medicare Wellness        Subjective   History of Present Illness:  Penny Delvalle is a 69 y.o. female who presents for an Initial Medicare Wellness Visit.    HEALTH RISK ASSESSMENT    Recent Hospitalizations:  No hospitalization(s) within the last year.    Current Medical Providers:  Patient Care Team:  Genie Tian DO as PCP - General (Family Medicine)  Genie Tian DO as PCP - Claims Attributed    Smoking Status:  Social History     Tobacco Use   Smoking Status Former Smoker   Smokeless Tobacco Never Used       Alcohol Consumption:  Social History     Substance and Sexual Activity   Alcohol Use Yes   • Alcohol/week: 8.4 oz   • Types: 14 Glasses of wine per week    Comment: 2-3 glasses of wine or bourbon daily        Depression Screen:   PHQ-2/PHQ-9 Depression Screening 8/15/2019   Little interest or pleasure in doing things 0   Feeling down, depressed, or hopeless 0   Total Score 0       Fall Risk Screen:  RAJAN Fall Risk Assessment was completed, and patient is at LOW risk for falls.Assessment completed on:8/15/2019    Health Habits and Functional and Cognitive Screening:  Functional & Cognitive Status 8/15/2019   Do you have difficulty preparing food and eating? No   Do you have difficulty bathing yourself, getting dressed or grooming yourself? No   Do you have difficulty using the toilet? No   Do you have difficulty moving around from place to place? No   Do you have trouble with steps or getting out of a bed or a chair? No   Current Diet Unhealthy Diet   Dental Exam Not up to date   Eye Exam Up to date   Exercise (times per week) 3 times per week   Current Exercise Activities Include Walking   Do you need help using the phone?  No   Are you deaf or do you have serious difficulty hearing?  Yes   Do you need help with transportation? No   Do you need help shopping?  No   Do you need help preparing meals?  No   Do you need help with housework?  No   Do you need help with laundry? No   Do you need help taking your medications? No   Do you need help managing money? No   Do you ever drive or ride in a car without wearing a seat belt? Yes   Have you felt unusual stress, anger or loneliness in the last month? No   Who do you live with? Spouse   If you need help, do you have trouble finding someone available to you? No   Have you been bothered in the last four weeks by sexual problems? No   Do you have difficulty concentrating, remembering or making decisions? No         Does the patient have evidence of cognitive impairment? No    Asprin use counseling:Does not need ASA (and currently is not on it)    Age-appropriate Screening Schedule:  Refer to the list below for future screening recommendations based on patient's age, sex and/or medical conditions. Orders for these recommended tests are listed in the plan section. The patient has been provided with a written plan.    Health Maintenance   Topic Date Due   • LIPID PANEL  09/25/2018   • INFLUENZA VACCINE  09/09/2019 (Originally 8/1/2019)   • MAMMOGRAM  09/12/2020   • COLONOSCOPY  07/27/2027   • PNEUMOCOCCAL VACCINES (65+ LOW/MEDIUM RISK)  Discontinued   • TDAP/TD VACCINES  Discontinued   • ZOSTER VACCINE  Discontinued          The following portions of the patient's history were reviewed and updated as appropriate: allergies, current medications, past family history, past medical history, past social history, past surgical history and problem list.    Outpatient Medications Prior to Visit   Medication Sig Dispense Refill   • atorvastatin (LIPITOR) 10 MG tablet TAKE 1 TABLET BY MOUTH EVERY NIGHT 90 tablet 0   • Biotin w/ Vitamins C & E (HAIR SKIN & NAILS GUMMIES PO) Take 2 tablets by mouth Daily.     • ibuprofen (ADVIL,MOTRIN) 600 MG tablet Take 1 tablet by mouth Every 6 (Six) Hours As Needed for Mild Pain  for up to 30 doses. 30  "tablet 0   • ondansetron (ZOFRAN) 4 MG tablet Take 1 tablet by mouth Every 8 (Eight) Hours As Needed for Nausea or Vomiting for up to 20 doses. 20 tablet 0     No facility-administered medications prior to visit.        Patient Active Problem List   Diagnosis   • Ovarian cyst       Advanced Care Planning:  Patient does not have an advance directive - not interested in additional information    Review of Systems   Constitutional: Negative.    HENT: Negative for congestion and hearing loss.    Eyes: Negative for visual disturbance.   Respiratory: Negative for cough, chest tightness and shortness of breath.    Cardiovascular: Negative for chest pain, palpitations and leg swelling.   Gastrointestinal: Negative for abdominal pain, blood in stool, constipation, diarrhea and vomiting.   Endocrine: Negative for cold intolerance and heat intolerance.   Genitourinary: Negative for dysuria.   Musculoskeletal: Positive for arthralgias (bilateral shoulders). Negative for back pain and gait problem.   Skin: Negative for rash.   Allergic/Immunologic: Negative for environmental allergies and food allergies.   Neurological: Negative for dizziness, numbness and headaches.   Hematological: Negative for adenopathy. Does not bruise/bleed easily.   Psychiatric/Behavioral: Negative for confusion and sleep disturbance.       Compared to one year ago, the patient feels her physical health is the same.  Compared to one year ago, the patient feels her mental health is the same.    Reviewed chart for potential of high risk medication in the elderly: not applicable  Reviewed chart for potential of harmful drug interactions in the elderly:not applicable    Objective         Vitals:    08/15/19 1017   BP: 112/76   BP Location: Left arm   Patient Position: Sitting   Cuff Size: Adult   Pulse: 78   Resp: 18   Temp: 97.5 °F (36.4 °C)   TempSrc: Temporal   Weight: 68.9 kg (152 lb)   Height: 174.6 cm (68.75\")   PainSc: 0-No pain       Body mass index " is 22.61 kg/m².  Discussed the patient's BMI with her. The BMI is in the acceptable range.    Physical Exam   Constitutional: She is oriented to person, place, and time. She appears well-developed and well-nourished.   HENT:   Head: Normocephalic and atraumatic.   Nose: Nose normal.   Eyes: Conjunctivae and EOM are normal.   Cardiovascular: Normal rate.   Pulmonary/Chest: Effort normal.   Musculoskeletal: She exhibits no edema.   Neurological: She is alert and oriented to person, place, and time.   Skin: Skin is warm and dry.   Psychiatric: She has a normal mood and affect. Her behavior is normal. Judgment and thought content normal.   Vitals reviewed.            Assessment/Plan   Medicare Risks and Personalized Health Plan  CMS Preventative Services Quick Reference  Fall Risk  Immunizations Discussed/Encouraged (specific immunizations; adacel Tdap, Influenza, Prevnar and Shingrix )  Osteoprorosis Risk    The above risks/problems have been discussed with the patient.  Pertinent information has been shared with the patient in the After Visit Summary.  Follow up plans and orders are seen below in the Assessment/Plan Section.    Diagnoses and all orders for this visit:    1. Other hyperlipidemia (Primary)  -     CBC & Differential; Future  -     Comprehensive Metabolic Panel; Future  -     Lipid Panel; Future    2. Need for hepatitis C screening test  -     Hepatitis C Antibody; Future    3. Medicare annual wellness visit, initial  -     CBC & Differential; Future  -     Comprehensive Metabolic Panel; Future    4. Screening for thyroid disorder  -     TSH Rfx On Abnormal To Free T4; Future    5. Postmenopausal  -     DEXA Bone Density Axial    6. History of salpingoophorectomy  -     DEXA Bone Density Axial    7. Need for vaccination for Strep pneumoniae  -     Pneumococcal Conjugate Vaccine 13-Valent All      Follow Up:  Return in about 1 year (around 8/15/2020) for Annual.     An After Visit Summary and PPPS were  given to the patient.    She will try over-the-counter turmeric to improve bilateral shoulder pain.  If symptoms persist, she has been encouraged to return for an evaluation.  Prevnar 13 completed today.  Encouraged her to complete Tdap, Shingrix, influenza vaccination at her local pharmacy.  DEXA scan ordered screen for osteoporosis.  Screening mammogram will be due in September 2019.

## 2019-08-15 NOTE — PATIENT INSTRUCTIONS
Go to the nearest ER or return to clinic if symptoms worsen, fever/chill develop    Medicare Wellness  Personal Prevention Plan of Service     Date of Office Visit:  08/15/2019  Encounter Provider:  Genie Tian DO  Place of Service:  [facility]  Patient Name: Penny Delvalle  :  1950    As part of the Medicare Wellness portion of your visit today, we are providing you with this personalized preventive plan of services (PPPS). This plan is based upon recommendations of the [place] Preventive Services Task Force (USPSTF) and the Advisory Committee on Immunization Practices (ACIP).    This lists the preventive care services that should be considered, and provides dates of when you are due. Items listed as completed are up-to-date and do not require any further intervention.    Health Maintenance   Topic Date Due   • HEPATITIS C SCREENING  2018   • LIPID PANEL  2018   • INFLUENZA VACCINE  2019   • MEDICARE ANNUAL WELLNESS  08/15/2020   • MAMMOGRAM  2020   • COLONOSCOPY  2027   • PNEUMOCOCCAL VACCINES (65+ LOW/MEDIUM RISK)  Discontinued   • TDAP/TD VACCINES  Discontinued   • ZOSTER VACCINE  Discontinued       Orders Placed This Encounter   Procedures   • DEXA Bone Density Axial     Order Specific Question:   Reason for Exam:     Answer:   screening   • Comprehensive Metabolic Panel     Standing Status:   Future     Standing Expiration Date:   8/15/2020   • Hepatitis C Antibody     Standing Status:   Future     Standing Expiration Date:   8/15/2020   • Lipid Panel     Standing Status:   Future     Standing Expiration Date:   8/15/2020   • TSH Rfx On Abnormal To Free T4     Standing Status:   Future     Standing Expiration Date:   8/15/2020   • CBC & Differential     Standing Status:   Future     Standing Expiration Date:   8/15/2020     Order Specific Question:   Manual Differential     Answer:   No       Return in about 1 year (around  8/15/2020) for Annual.

## 2019-08-20 DIAGNOSIS — M81.0 AGE-RELATED OSTEOPOROSIS WITHOUT CURRENT PATHOLOGICAL FRACTURE: Primary | ICD-10-CM

## 2019-08-23 ENCOUNTER — TELEPHONE (OUTPATIENT)
Dept: FAMILY MEDICINE CLINIC | Facility: CLINIC | Age: 69
End: 2019-08-23

## 2019-08-23 DIAGNOSIS — M81.0 AGE-RELATED OSTEOPOROSIS WITHOUT CURRENT PATHOLOGICAL FRACTURE: Primary | ICD-10-CM

## 2019-08-23 RX ORDER — ALENDRONATE SODIUM 70 MG/1
70 TABLET ORAL WEEKLY
Qty: 12 TABLET | Refills: 3 | Status: SHIPPED | OUTPATIENT
Start: 2019-08-23 | End: 2021-11-08

## 2019-08-23 NOTE — TELEPHONE ENCOUNTER
----- Message from Kaylee Leong MA sent at 8/23/2019 12:56 PM EDT -----  Contact: mookie/patient   Patient called back in regards to her bone density test. States she would like to know how bad her osteo is and if she has to start a medication (fosamax) or if the vitamin D and Calcium would be sufficient for a while and then retest and see if it is working. Patient states she is not a big pill taker. Please advise.

## 2019-08-23 NOTE — TELEPHONE ENCOUNTER
Osteoporosis is present in her forearm. Osteopenia is present in lumbar spine and left femoral neck (hip area). If osteopenia is left untreated, it can progress to osteoporosis.   Calcium and vitamin D will improve osteopenia and is also recommended for osteoporosis. However, to promote new bone growth, a medication like fosamax is needed. She is at a higher risk for fracture with a diagnosis of osteoporosis.

## 2019-08-29 ENCOUNTER — RESULTS ENCOUNTER (OUTPATIENT)
Dept: FAMILY MEDICINE CLINIC | Facility: CLINIC | Age: 69
End: 2019-08-29

## 2019-08-29 DIAGNOSIS — Z11.59 NEED FOR HEPATITIS C SCREENING TEST: ICD-10-CM

## 2019-08-29 DIAGNOSIS — E78.49 OTHER HYPERLIPIDEMIA: ICD-10-CM

## 2019-08-29 DIAGNOSIS — Z00.00 MEDICARE ANNUAL WELLNESS VISIT, INITIAL: ICD-10-CM

## 2019-08-29 DIAGNOSIS — Z13.29 SCREENING FOR THYROID DISORDER: ICD-10-CM

## 2019-09-01 ENCOUNTER — DOCUMENTATION (OUTPATIENT)
Dept: FAMILY MEDICINE CLINIC | Facility: CLINIC | Age: 69
End: 2019-09-01

## 2019-09-02 NOTE — PROGRESS NOTES
ON CALL NOTE    Patient called on the evening of 8/30/19 around 1930 concerned about gross hematuria. She had no other symptoms. Specifically, no frequency, urgency, dysuria or fever. She had not had this symptom before and was asking for advice on what to do.    I recommended that as long as she was feeling okay, to go to a [facility] the next morning for urinalysis and a visit with the provider there. Explained it could be a UTI, but since she had no history of that as well as a lack of other symptoms, she should be evaluated. Our office is not open for 72 hours due to the Labor Day holiday. She agreed and planned to follow through on 8/31/19.

## 2019-09-11 ENCOUNTER — OFFICE VISIT (OUTPATIENT)
Dept: FAMILY MEDICINE CLINIC | Facility: CLINIC | Age: 69
End: 2019-09-11

## 2019-09-11 VITALS
HEART RATE: 88 BPM | BODY MASS INDEX: 22.46 KG/M2 | RESPIRATION RATE: 16 BRPM | DIASTOLIC BLOOD PRESSURE: 78 MMHG | SYSTOLIC BLOOD PRESSURE: 132 MMHG | TEMPERATURE: 97.9 F | WEIGHT: 151 LBS

## 2019-09-11 DIAGNOSIS — G89.29 CHRONIC RIGHT SHOULDER PAIN: ICD-10-CM

## 2019-09-11 DIAGNOSIS — N39.0 E. COLI UTI: Primary | ICD-10-CM

## 2019-09-11 DIAGNOSIS — M25.511 CHRONIC RIGHT SHOULDER PAIN: ICD-10-CM

## 2019-09-11 DIAGNOSIS — B96.20 E. COLI UTI: Primary | ICD-10-CM

## 2019-09-11 LAB
BILIRUB BLD-MCNC: NEGATIVE MG/DL
CLARITY, POC: ABNORMAL
COLOR UR: YELLOW
GLUCOSE UR STRIP-MCNC: NEGATIVE MG/DL
KETONES UR QL: NEGATIVE
LEUKOCYTE EST, POC: NEGATIVE
NITRITE UR-MCNC: NEGATIVE MG/ML
PH UR: 6.5 [PH] (ref 5–8)
PROT UR STRIP-MCNC: NEGATIVE MG/DL
RBC # UR STRIP: ABNORMAL /UL
SP GR UR: 1.01 (ref 1–1.03)
UROBILINOGEN UR QL: NORMAL

## 2019-09-11 PROCEDURE — 81002 URINALYSIS NONAUTO W/O SCOPE: CPT | Performed by: FAMILY MEDICINE

## 2019-09-11 PROCEDURE — 99213 OFFICE O/P EST LOW 20 MIN: CPT | Performed by: FAMILY MEDICINE

## 2019-09-11 RX ORDER — DICLOFENAC POTASSIUM 50 MG/1
50 TABLET, FILM COATED ORAL 2 TIMES DAILY PRN
Qty: 60 TABLET | Refills: 1 | Status: SHIPPED | OUTPATIENT
Start: 2019-09-11 | End: 2019-09-22 | Stop reason: ALTCHOICE

## 2019-09-11 RX ORDER — CIPROFLOXACIN 500 MG/1
500 TABLET, FILM COATED ORAL 2 TIMES DAILY
Qty: 14 TABLET | Refills: 0 | Status: SHIPPED | OUTPATIENT
Start: 2019-09-11 | End: 2019-09-18

## 2019-09-11 RX ORDER — DICLOFENAC POTASSIUM 50 MG/1
POWDER, FOR SOLUTION ORAL
COMMUNITY
End: 2019-09-11 | Stop reason: ALTCHOICE

## 2019-09-11 NOTE — PATIENT INSTRUCTIONS
Go to the nearest ER or return to clinic if symptoms worsen, fever/chill develop    Urine Culture and Sensitivity Testing  Why am I having this test?  A urine culture is a test to see if bacteria or yeast grow from your urine sample. Normally, urine is mostly germ-free. Bacteria or yeast in the urine may cause a urinary tract infection (UTI). You may have this test:  · If you have symptoms of a UTI, such as:  ? Frequent urination or passing small amounts of urine frequently.  ? Burning or pain when urinating.  ? Needing to urinate urgently.  · If you are pregnant. Pregnant women are at increased risk for UTIs and are screened for UTIs routinely.  What is being tested?  This test checks whether any of the following are present in your urine:  · Bacteria.  · Yeast.  What kind of sample is taken?  A urine sample is required for this test. The urine must be collected in a way that prevents the bacteria that is always on the skin (normal frank) from getting into the sample. There are two ways to do this:  · The clean-catch method. This is the most common way of getting a clean urine sample. You may collect a clean-catch sample at home or at the lab. Your health care provider may give you sterile wipes to clean your vagina or penis to prepare for collecting a clean-catch sample.  · Inserting a small, thin tube (catheter) into the part of the body that drains urine from the bladder (urethra). This method allows urine to be collected directly from the bladder.  ? In women, the urethral opening is just above the vaginal opening.   ? In men, the urethra opens at the tip of the penis.  How do I prepare for this test?  · Do not urinate for about an hour before collecting the sample.  · Drink a glass of water about 20 minutes before collecting the sample.  What happens during the test?  Your urine sample will be placed onto plates that contain a substance that encourages bacteria and yeast to grow (agar plates). These plates  will be kept at body temperature for 24-48 hours to see if bacteria, yeast, or other germs grow. Then, the plates will be examined under a microscope.  Any bacteria or yeast that grows from the culture will be tested against a variety of medicines to find the medicine that works best (sensitivity testing). For a UTI caused by bacteria, several types of antibiotic medicines may be tested.  How are the results reported?  Your culture test results will be reported as either positive or negative. Your sensitivity test results will be reported as a list of medicines that can be used to treat your infection.  What do the results mean?  A positive test result means that bacteria or yeast grew from your urine sample. This may mean that you have a UTI, and you may need to start taking antibiotic or antifungal medicines based on your sensitivity test results.  A negative test result means that no or few bacteria or yeast grew from your sample after 24-48 hours. This means that it is less likely that you have a UTI. If you still have symptoms, your test may be repeated.  A contaminated test result means that many different types of bacteria or yeast grew in your urine sample, and your sample most likely has normal frank in it. This sample cannot be used to make a diagnosis, and your test may need to be repeated.  Talk with your health care provider about what your results mean.  Questions to ask your health care provider  Ask your health care provider, or the department that is doing the test:  · When will my results be ready?  · How will I get my results?  · What are my treatment options?  · What other tests do I need?  · What are my next steps?  Summary  · A urine culture is a test to see if bacteria or yeast grow from your urine sample.  · A urine sample may be collected using the clean-catch method or a urinary catheter.  · Your urine sample will be placed onto plates that contain a substance that encourages bacteria and  yeast to grow.  · Any bacteria or yeast that grows from the culture will be tested against a variety of medicines to find the medicine that works best (sensitivity testing).  This information is not intended to replace advice given to you by your health care provider. Make sure you discuss any questions you have with your health care provider.  Document Released: 01/12/2006 Document Revised: 08/30/2018 Document Reviewed: 08/30/2018  [employer] Interactive Patient Education © 2019 [employer]

## 2019-09-11 NOTE — PROGRESS NOTES
Subjective   Penny Delvalle is a 69 y.o. female.   Chief Complaint   Patient presents with   • Follow-up     from [facility], blood in urine. patient was given antibiotic. culture came back with ecoli per patient      Urinary Tract Infection    This is a new problem. The current episode started 1 to 4 weeks ago. The problem occurs every urination. The quality of the pain is described as aching. There has been no fever. Associated symptoms include hematuria and urgency. Pertinent negatives include no chills, frequency or vomiting. She has tried antibiotics (Macrobid) for the symptoms. The treatment provided no relief.   Even after completing Macrobid, she is still experiencing urinary urgency.     She has chronic right shoulder pain, improved with Diclofenac potassium. She is requesting a refill of this medication.     The following portions of the patient's history were reviewed and updated as appropriate: allergies, current medications, past family history, past medical history, past social history, past surgical history and problem list.    Review of Systems   Constitutional: Negative for chills and fever.   Respiratory: Negative.    Cardiovascular: Negative.    Gastrointestinal: Negative for vomiting.   Genitourinary: Positive for hematuria, pelvic pain and urgency. Negative for frequency.   Musculoskeletal: Positive for arthralgias (right shoulder, chronic).       Objective   Physical Exam   Constitutional: She appears well-developed and well-nourished.   HENT:   Head: Normocephalic and atraumatic.   Right Ear: External ear normal.   Left Ear: External ear normal.   Nose: Nose normal.   Eyes: Conjunctivae are normal.   Cardiovascular: Normal rate, regular rhythm and normal heart sounds.   Pulmonary/Chest: Effort normal and breath sounds normal.   Abdominal: There is no tenderness. There is no CVA tenderness.   Neurological: She is alert.   Nursing note and vitals reviewed.        Assessment/Plan   Penny was seen  today for follow-up.    Diagnoses and all orders for this visit:    E. coli UTI  -     POC Urinalysis Dipstick  -     Urine Culture - Urine, Urine, Clean Catch  -     Urinalysis With Culture If Indicated -; Future  -     ciprofloxacin (CIPRO) 500 MG tablet; Take 1 tablet by mouth 2 (Two) Times a Day for 7 days.    Chronic right shoulder pain  -     diclofenac (CATAFLAM) 50 MG tablet; Take 1 tablet by mouth 2 (Two) Times a Day As Needed (joint pain).      UA and urine culture repeated today.   Based on culture from outside clinic, will treat with Cipro. She will return after completing antibiotic to repeat urinalysis.   Diclofenac refilled per her request.

## 2019-09-13 LAB
BACTERIA UR CULT: NORMAL
BACTERIA UR CULT: NORMAL

## 2019-09-16 ENCOUNTER — TELEPHONE (OUTPATIENT)
Dept: FAMILY MEDICINE CLINIC | Facility: CLINIC | Age: 69
End: 2019-09-16

## 2019-09-16 DIAGNOSIS — R31.9 HEMATURIA, UNSPECIFIED TYPE: Primary | ICD-10-CM

## 2019-09-16 NOTE — TELEPHONE ENCOUNTER
Notes recorded by Genie Tian DO on 9/14/2019 at 12:56 PM EDT    Urine culture didn't have significant bacterial growth, no UTI present. Advise to stop antibiotic.

## 2019-09-16 NOTE — TELEPHONE ENCOUNTER
There is no need for an antibiotic, as urine culture doesn't show an active infection.    Urology referral placed.

## 2019-09-20 ENCOUNTER — RESULTS ENCOUNTER (OUTPATIENT)
Dept: FAMILY MEDICINE CLINIC | Facility: CLINIC | Age: 69
End: 2019-09-20

## 2019-09-20 DIAGNOSIS — M81.0 AGE-RELATED OSTEOPOROSIS WITHOUT CURRENT PATHOLOGICAL FRACTURE: ICD-10-CM

## 2019-09-25 ENCOUNTER — RESULTS ENCOUNTER (OUTPATIENT)
Dept: FAMILY MEDICINE CLINIC | Facility: CLINIC | Age: 69
End: 2019-09-25

## 2019-09-25 DIAGNOSIS — N39.0 E. COLI UTI: ICD-10-CM

## 2019-09-25 DIAGNOSIS — B96.20 E. COLI UTI: ICD-10-CM

## 2019-12-09 DIAGNOSIS — E78.49 OTHER HYPERLIPIDEMIA: ICD-10-CM

## 2019-12-11 RX ORDER — ATORVASTATIN CALCIUM 10 MG/1
10 TABLET, FILM COATED ORAL NIGHTLY
Qty: 90 TABLET | Refills: 0 | Status: SHIPPED | OUTPATIENT
Start: 2019-12-11 | End: 2020-03-13

## 2020-03-13 DIAGNOSIS — E78.49 OTHER HYPERLIPIDEMIA: ICD-10-CM

## 2020-03-13 RX ORDER — ATORVASTATIN CALCIUM 10 MG/1
TABLET, FILM COATED ORAL
Qty: 90 TABLET | Refills: 0 | Status: SHIPPED | OUTPATIENT
Start: 2020-03-13 | End: 2020-10-14

## 2020-06-05 ENCOUNTER — TELEMEDICINE (OUTPATIENT)
Dept: FAMILY MEDICINE CLINIC | Facility: CLINIC | Age: 70
End: 2020-06-05

## 2020-06-05 DIAGNOSIS — S00.461A TICK BITE OF RIGHT EAR, INITIAL ENCOUNTER: Primary | ICD-10-CM
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
## 2020-06-05 PROCEDURE — 99213 OFFICE O/P EST LOW 20 MIN: CPT | Performed by: FAMILY MEDICINE

## 2020-06-05 RX ORDER — DOXYCYCLINE 100 MG/1
100 CAPSULE ORAL 2 TIMES DAILY
Qty: 20 CAPSULE | Refills: 0 | Status: SHIPPED | OUTPATIENT
Start: 2020-06-05 | End: 2020-06-15

## 2020-06-05 NOTE — PATIENT INSTRUCTIONS
Tick Bite Information, Adult    Ticks are insects that can bite. Most ticks live in shrubs and grassy areas. They climb onto people and animals that go by. Then they bite. Some ticks carry germs that can make you sick.  How can I prevent tick bites?  · Use an insect repellent that has 20% or higher of the ingredients DEET, picaridin, or [id number]. Put this insect repellent on:  ? Bare skin.  ? The tops of your boots.  ? Your pant legs.  ? The ends of your sleeves.  · If you use an insect repellent that has the ingredient permethrin, make sure to follow the instructions on the bottle. Treat the following:  ? Clothing.  ? Supplies.  ? Boots.  ? Tents.  · Wear long sleeves, long pants, and light colors.  · Tuck your pant legs into your socks.  · Stay in the middle of the trail.  · Try not to walk through long grass.  · Before going inside your house, check your clothes, hair, and skin for ticks. Make sure to check your head, neck, armpits, waist, groin, and joint areas.  · Check for ticks every day.  · When you come indoors:  ? Wash your clothes right away.  ? Shower right away.  ? Dry your clothes in a dryer on high heat for 60 minutes or more.  What is the right way to remove a tick?  Remove a tick from your skin as soon as possible.  · To remove a tick that is crawling on your skin:  ? Go outdoors and brush the tick off.  ? Use tape or a lint roller.  · To remove a tick that is biting:  ? Wash your hands.  ? If you have latex gloves, put them on.  ? Use tweezers, curved forceps, or a tick-removal tool to grasp the tick. Grasp the tick as close to your skin and as close to the tick's head as possible.  ? Gently pull up until the tick lets go.  § Try to keep the tick's head attached to its body.  § Do not twist or jerk the tick.  § Do not squeeze or crush the tick.  Do not try to remove a tick with heat, alcohol, petroleum jelly, or fingernail polish.  How should I get rid of a tick?  Here are some ways to get rid of a  tick that is alive:  · Place the tick in rubbing alcohol.  · Place the tick in a bag or container you can close tightly.  · Wrap the tick tightly in tape.  · Flush the tick down the toilet.  Contact a doctor if:  · You have symptoms of a disease, such as:  ? Pain in a muscle, joint, or bone.  ? Trouble walking or moving your legs.  ? Numbness in your legs.  ? Inability to move (paralysis).  ? A red rash that makes a [place] (bull's-eye rash).  ? Redness and swelling where the tick bit you.  ? A fever.  ? Throwing up (vomiting) over and over.  ? Diarrhea.  ? Weight loss.  ? Tender and swollen lymph glands.  ? Shortness of breath.  ? Cough.  ? Belly pain (abdominal pain).  ? Headache.  ? Being more tired than normal.  ? A change in how alert (conscious) you are.  ? Confusion.  Get help right away if:  · You cannot remove a tick.  · A part of a tick breaks off and gets stuck in your skin.  · You are feeling worse.  Summary  · Ticks may carry germs that can make you sick.  · To prevent tick bites, wear long sleeves, long pants, and light colors. Use insect repellent. Follow the instructions on the bottle.  · If the tick is biting, do not try to remove it with heat, alcohol, petroleum jelly, or fingernail polish.  · Use tweezers, curved forceps, or a tick-removal tool to grasp the tick. Gently pull up until the tick lets go. Do not twist or jerk the tick. Do not squeeze or crush the tick.  · If you have symptoms, contact a doctor.  This information is not intended to replace advice given to you by your health care provider. Make sure you discuss any questions you have with your health care provider.  Document Released: 03/14/2011 Document Revised: 12/02/2019 Document Reviewed: 03/30/2018  [employer] Patient Education © 2020 [employer]

## 2020-06-05 NOTE — PROGRESS NOTES
Fetal Surveillance reassurring   Subjective   Penny Delvalle is a 69 y.o. female.   Chief Complaint   Patient presents with   • tick bite   You have chosen to receive care through a telehealth visit.  Do you consent to use a video/audio connection for your medical care today? Yes    History of Present Illness   She was bit by a tick on right ear x 1 week ago. She thinks that tick was attached less than 24 hours, but not completely sure. Placed rubbing alcohol and Neosporin initially. Afterwards, the ear was inflamed, red, and swollen x 5 days. 2 days ago, erythema and edema improved, but the area is still weeping.   No fever or joint pain.     The following portions of the patient's history were reviewed and updated as appropriate: allergies, current medications, past family history, past medical history, past social history, past surgical history and problem list.    Review of Systems   Constitutional: Negative for activity change, appetite change and fever.   Respiratory: Negative.    Cardiovascular: Negative.    Musculoskeletal: Negative for arthralgias and joint swelling.   Skin: Positive for color change.       Objective   Physical Exam   Constitutional: She is oriented to person, place, and time. She appears well-developed and well-nourished. No distress.   HENT:   Head: Normocephalic.   Right Ear: External ear normal.   Left Ear: External ear normal.   Pulmonary/Chest: Effort normal.   Neurological: She is alert and oriented to person, place, and time.   Psychiatric: Her behavior is normal.     Unable to perform complete physical exam due to video encounter.    Assessment/Plan   Penny was seen today for tick bite.    Diagnoses and all orders for this visit:    Tick bite of right ear, initial encounter  -     doxycycline (MONODOX) 100 MG capsule; Take 1 capsule by mouth 2 (Two) Times a Day for 10 days.  -     Lyme, Total Antibody Test / Reflex; Future      Will treat with Doxycycline, as she is unsure how long tick was attached and she  continues to have skin changes at bite site.   She would like to complete lyme testing, order placed.     This was an audio and video enabled telemedicine encounter. Time spent during encounter: 15 minutes.

## 2020-06-19 ENCOUNTER — RESULTS ENCOUNTER (OUTPATIENT)
Dept: FAMILY MEDICINE CLINIC | Facility: CLINIC | Age: 70
End: 2020-06-19
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
## 2020-06-19 DIAGNOSIS — S00.461A TICK BITE OF RIGHT EAR, INITIAL ENCOUNTER: ICD-10-CM

## 2020-10-14 DIAGNOSIS — E78.49 OTHER HYPERLIPIDEMIA: ICD-10-CM

## 2020-10-14 RX ORDER — ATORVASTATIN CALCIUM 10 MG/1
TABLET, FILM COATED ORAL
Qty: 30 TABLET | Refills: 0 | Status: SHIPPED | OUTPATIENT
Start: 2020-10-14 | End: 2021-01-27 | Stop reason: SDUPTHER

## 2020-10-26 ENCOUNTER — PATIENT OUTREACH (OUTPATIENT)
Dept: CASE MANAGEMENT | Facility: OTHER | Age: 70
End: 2020-10-26

## 2020-10-26 NOTE — OUTREACH NOTE
Care Coordination Note    Voicemail left for patient to schedule Medicare AWV.    Alethea Lim RN  Ambulatory     10/26/2020, 15:43 EDT

## 2021-01-21 DIAGNOSIS — E78.49 OTHER HYPERLIPIDEMIA: ICD-10-CM

## 2021-01-21 RX ORDER — ATORVASTATIN CALCIUM 10 MG/1
TABLET, FILM COATED ORAL
Qty: 30 TABLET | Refills: 0 | OUTPATIENT
Start: 2021-01-21

## 2021-01-25 ENCOUNTER — TELEPHONE (OUTPATIENT)
Dept: FAMILY MEDICINE CLINIC | Facility: CLINIC | Age: 71
End: 2021-01-25

## 2021-01-25 DIAGNOSIS — E78.49 OTHER HYPERLIPIDEMIA: ICD-10-CM

## 2021-01-25 NOTE — TELEPHONE ENCOUNTER
PATIENT WOULD LIKE TO HAVE LABS PRIOR TO APPOINTMENT, DIDN'T SCHEDULE VIDEO VISIT AS OF NOW. WOULD LIKE ORDERS IF POSSIBLE.     WOULD ALSO LIKE REFILL ON atorvastatin (LIPITOR) 10 MG tablet TO [facility], ALREADY REQUESTED.  PATIENT WILL MAKE APPOINTMENT AFTER CHECKING TO SEE IF LABS CAN BE DONE PRIOR TO APPOINTMENT OR NOT.     PLEASE CALL [phone]

## 2021-01-27 RX ORDER — ATORVASTATIN CALCIUM 10 MG/1
10 TABLET, FILM COATED ORAL NIGHTLY
Qty: 30 TABLET | Refills: 0 | Status: SHIPPED | OUTPATIENT
Start: 2021-01-27 | End: 2021-02-03 | Stop reason: SDUPTHER

## 2021-02-03 ENCOUNTER — TELEMEDICINE (OUTPATIENT)
Dept: FAMILY MEDICINE CLINIC | Facility: CLINIC | Age: 71
End: 2021-02-03

## 2021-02-03 DIAGNOSIS — E78.49 OTHER HYPERLIPIDEMIA: ICD-10-CM

## 2021-02-03 DIAGNOSIS — M81.0 AGE-RELATED OSTEOPOROSIS WITHOUT CURRENT PATHOLOGICAL FRACTURE: Primary | ICD-10-CM

## 2021-02-03 DIAGNOSIS — K58.0 IRRITABLE BOWEL SYNDROME WITH DIARRHEA: ICD-10-CM

## 2021-02-03 DIAGNOSIS — L98.9 SKIN LESION OF FACE: ICD-10-CM

## 2021-02-03 DIAGNOSIS — Z11.59 ENCOUNTER FOR HEPATITIS C SCREENING TEST FOR LOW RISK PATIENT: ICD-10-CM

## 2021-02-03 DIAGNOSIS — Z12.31 ENCOUNTER FOR SCREENING MAMMOGRAM FOR BREAST CANCER: ICD-10-CM

## 2021-02-03 PROCEDURE — 99214 OFFICE O/P EST MOD 30 MIN: CPT | Performed by: FAMILY MEDICINE

## 2021-02-03 RX ORDER — ATORVASTATIN CALCIUM 10 MG/1
10 TABLET, FILM COATED ORAL NIGHTLY
Qty: 90 TABLET | Refills: 0 | Status: SHIPPED | OUTPATIENT
Start: 2021-02-03 | End: 2021-09-29

## 2021-02-03 RX ORDER — DICYCLOMINE HCL 20 MG
20 TABLET ORAL EVERY 6 HOURS PRN
Qty: 60 TABLET | Refills: 1 | Status: SHIPPED | OUTPATIENT
Start: 2021-02-03 | End: 2021-11-08

## 2021-02-03 NOTE — PATIENT INSTRUCTIONS
Go to the nearest ER or return to clinic if symptoms worsen, fever/chill develop    Osteoporosis    Osteoporosis happens when your bones get thin and weak. This can cause your bones to break (fracture) more easily. You can do things at home to make your bones stronger.  Follow these instructions at home:    Activity  · Exercise as told by your doctor. Ask your doctor what activities are safe for you. You should do:  ? Exercises that make your muscles work to hold your body weight up (weight-bearing exercises). These include mandi chi, yoga, and walking.  ? Exercises to make your muscles stronger. One example is lifting weights.  Lifestyle  · Limit alcohol intake to no more than 1 drink a day for nonpregnant women and 2 drinks a day for men. One drink equals 12 oz of beer, 5 oz of wine, or 1½ oz of hard liquor.  · Do not use any products that have nicotine or tobacco in them. These include cigarettes and e-cigarettes. If you need help quitting, ask your doctor.  Preventing falls  · Use tools to help you move around (mobility aids) as needed. These include canes, walkers, scooters, and crutches.  · Keep rooms well-lit and free of clutter.  · Put away things that could make you trip. These include cords and rugs.  · Install safety rails on stairs. Install grab bars in bathrooms.  · Use rubber mats in slippery areas, like bathrooms.  · Wear shoes that:  ? Fit you well.  ? Support your feet.  ? Have closed toes.  ? Have rubber soles or low heels.  · Tell your doctor about all of the medicines you are taking. Some medicines can make you more likely to fall.  General instructions  · Eat plenty of calcium and vitamin D. These nutrients are good for your bones. Good sources of calcium and vitamin D include:  ? Some fatty fish, such as salmon and tuna.  ? Foods that have calcium and vitamin D added to them (fortified foods). For example, some breakfast cereals are fortified with calcium and vitamin D.  ? Egg  yolks.  ? Cheese.  ? Liver.  · Take over-the-counter and prescription medicines only as told by your doctor.  · Keep all follow-up visits as told by your doctor. This is important.  Contact a doctor if:  · You have not been tested (screened) for osteoporosis and you are:  ? A woman who is age 65 or older.  ? A man who is age 70 or older.  Get help right away if:  · You fall.  · You get hurt.  Summary  · Osteoporosis happens when your bones get thin and weak.  · Weak bones can break (fracture) more easily.  · Eat plenty of calcium and vitamin D. These nutrients are good for your bones.  · Tell your doctor about all of the medicines that you take.  This information is not intended to replace advice given to you by your health care provider. Make sure you discuss any questions you have with your health care provider.  Document Revised: 11/30/2018 Document Reviewed: 10/12/2018  Elsevier Patient Education © 2020 [employer]

## 2021-02-03 NOTE — PROGRESS NOTES
Chief Complaint  Follow-up, Irritable Bowel Syndrome, and Hyperlipidemia    You have chosen to receive care through a telehealth visit.  Do you consent to use a video/audio connection for your medical care today? Yes    Subjective          Penny Delvalle presents to [facility] for   Hyperlipidemia  This is a chronic problem. The current episode started more than 1 year ago. The problem is controlled. There are no known factors aggravating her hyperlipidemia. Current antihyperlipidemic treatment includes statins. The current treatment provides significant improvement of lipids. Risk factors for coronary artery disease include post-menopausal and dyslipidemia.     She has IBS. 1 month ago, she would have diarrhea, abdominal cramping 20 minutes after she eats. This typically occurs every 2-3 months, lasts 1-2 weeks. Started taking a probiotic, Florastor, which caused her to be constipated.   Now taking Physician's Choice Probiotic and improved her diet. Hasn't tried dicyclomine or     She has osteoporosis. She was prescribed Fosamax in August 2019, but never started treatment. She also isn't taking any vitamin D or calcium replacement.     History of skin cancer on her face, saw dermatology in 2018 and had lesions removed. Would like to go back to [facility]      Objective   Vital Signs:   There were no vitals taken for this visit.    Physical Exam  Constitutional:       Appearance: She is well-developed.   HENT:      Head: Normocephalic and atraumatic.      Nose: Nose normal.   Eyes:      Conjunctiva/sclera: Conjunctivae normal.   Pulmonary:      Effort: Pulmonary effort is normal. No respiratory distress.   Neurological:      Mental Status: She is alert and oriented to person, place, and time.   Psychiatric:         Behavior: Behavior normal.             Result Review :                 Assessment and Plan    Problem List Items Addressed This Visit         Musculoskeletal and Injuries    Age-related osteoporosis without current pathological fracture - Primary    Relevant Orders    DEXA Bone Density Axial    CBC & Differential    Comprehensive Metabolic Panel    Vitamin D 25 Hydroxy    TSH Rfx On Abnormal To Free T4      Other Visit Diagnoses     Other hyperlipidemia        Relevant Medications    atorvastatin (LIPITOR) 10 MG tablet    Other Relevant Orders    CBC & Differential    Comprehensive Metabolic Panel    Lipid Panel With / Chol / HDL Ratio    TSH Rfx On Abnormal To Free T4    Irritable bowel syndrome with diarrhea        Relevant Medications    dicyclomine (BENTYL) 20 MG tablet    Other Relevant Orders    CBC & Differential    Comprehensive Metabolic Panel    TSH Rfx On Abnormal To Free T4    Skin lesion of face        Relevant Orders    Ambulatory Referral to Dermatology    Encounter for hepatitis C screening test for low risk patient        Relevant Orders    Hepatitis C Antibody    Encounter for screening mammogram for breast cancer        Relevant Orders    Mammo Screening Digital Tomosynthesis Bilateral With CAD      Okay to update Pneumovax 23 when she comes in to complete labs.     Prescription of dicyclomine to use as needed for IBS symptoms  She will come in to complete fasting labs.  She will also start Fosamax for treatment of osteoporosis.  Also encouraged her to take both vitamin D and calcium daily replacement.       I spent 30 minutes caring for Penny on this date of service. This time includes time spent by me in the following activities:preparing for the visit, ordering medications, tests, or procedures and documenting information in the medical record  Follow Up   Return in about 1 year (around 2/3/2022) for Medicare Wellness.  Patient was given instructions and counseling regarding her condition or for health maintenance advice. Please see specific information pulled into the AVS if appropriate.

## 2021-02-23 ENCOUNTER — LAB (OUTPATIENT)
Dept: FAMILY MEDICINE CLINIC | Facility: CLINIC | Age: 71
End: 2021-02-23

## 2021-02-23 DIAGNOSIS — Z23 NEED FOR PNEUMOCOCCAL VACCINATION: Primary | ICD-10-CM

## 2021-02-23 PROCEDURE — 90732 PPSV23 VACC 2 YRS+ SUBQ/IM: CPT | Performed by: NURSE PRACTITIONER

## 2021-02-23 PROCEDURE — G0009 ADMIN PNEUMOCOCCAL VACCINE: HCPCS | Performed by: NURSE PRACTITIONER

## 2021-05-27 ENCOUNTER — OFFICE VISIT (OUTPATIENT)
Dept: FAMILY MEDICINE CLINIC | Facility: CLINIC | Age: 71
End: 2021-05-27

## 2021-05-27 ENCOUNTER — HOSPITAL ENCOUNTER (OUTPATIENT)
Dept: GENERAL RADIOLOGY | Facility: HOSPITAL | Age: 71
Discharge: HOME | End: 2021-05-27
Admitting: PHYSICIAN ASSISTANT

## 2021-05-27 VITALS
HEART RATE: 72 BPM | BODY MASS INDEX: 23.5 KG/M2 | WEIGHT: 158 LBS | DIASTOLIC BLOOD PRESSURE: 76 MMHG | RESPIRATION RATE: 18 BRPM | SYSTOLIC BLOOD PRESSURE: 118 MMHG | TEMPERATURE: 98.4 F

## 2021-05-27 DIAGNOSIS — M25.561 ACUTE PAIN OF RIGHT KNEE: Primary | ICD-10-CM

## 2021-05-27 PROCEDURE — 73560 X-RAY EXAM OF KNEE 1 OR 2: CPT

## 2021-05-27 PROCEDURE — 99213 OFFICE O/P EST LOW 20 MIN: CPT | Performed by: PHYSICIAN ASSISTANT

## 2021-05-27 RX ORDER — PREDNISONE 10 MG/1
TABLET ORAL
Qty: 21 EACH | Refills: 0 | Status: SHIPPED | OUTPATIENT
Start: 2021-05-27 | End: 2021-11-08

## 2021-05-31 NOTE — PROGRESS NOTES
Subjective   Penny Delvalle is a 70 y.o. female.     History of Present Illness   Pt presents with CC of right knee that started within the last week   No known injury   Leg feels stiff and tight   Pain behind the knee   Hard time ambulating at times   Has tried NSAID without relief   No hx of surgery on this knee   Mild swelling she has noticed   No hx of blood clot     The following portions of the patient's history were reviewed and updated as appropriate: allergies, current medications, past family history, past medical history, past social history, past surgical history and problem list.    Review of Systems   Constitutional: Negative.  Negative for chills, diaphoresis, fatigue and fever.   HENT: Negative.  Negative for congestion, ear discharge, ear pain, hearing loss, nosebleeds, postnasal drip, sinus pressure, sneezing and sore throat.    Eyes: Negative.    Respiratory: Negative.  Negative for cough, chest tightness, shortness of breath and wheezing.    Cardiovascular: Negative for chest pain and leg swelling.   Gastrointestinal: Negative for diarrhea, nausea and vomiting.   Musculoskeletal: Positive for arthralgias, gait problem, joint swelling and myalgias. Negative for back pain, neck pain and neck stiffness.   Skin: Negative.  Negative for color change, pallor, rash and wound.   Neurological: Negative for dizziness, syncope, weakness, light-headedness, numbness and headaches.       Objective   Physical Exam  Vitals and nursing note reviewed.   Constitutional:       Appearance: She is well-developed.   HENT:      Head: Normocephalic and atraumatic.      Right Ear: External ear normal.      Left Ear: External ear normal.      Nose: Nose normal.   Eyes:      Conjunctiva/sclera: Conjunctivae normal.   Neck:      Thyroid: No thyromegaly.      Trachea: No tracheal deviation.   Cardiovascular:      Rate and Rhythm: Normal rate and regular rhythm.      Heart sounds: Normal heart sounds.   Pulmonary:       Effort: Pulmonary effort is normal. No respiratory distress.      Breath sounds: Normal breath sounds. No wheezing or rales.   Chest:      Chest wall: No tenderness.   Musculoskeletal:         General: No deformity.      Cervical back: Normal range of motion and neck supple.      Right knee: Effusion present. Decreased range of motion. Tenderness present over the medial joint line and patellar tendon.      Comments: TTP of R posterior knee    Lymphadenopathy:      Cervical: No cervical adenopathy.   Skin:     General: Skin is warm and dry.   Neurological:      Mental Status: She is alert and oriented to person, place, and time.   Psychiatric:         Behavior: Behavior normal.         Thought Content: Thought content normal.         Judgment: Judgment normal.         Assessment/Plan   Diagnoses and all orders for this visit:    1. Acute pain of right knee (Primary)  -     XR Knee 1 or 2 View Right  -     predniSONE (DELTASONE) 10 MG (21) dose pack; Use as directed on package  Dispense: 21 each; Refill: 0    proceed with XR of knee as outlined in plan   Steroid taper and encourage rest, ice, and compression. F/u pending XR

## 2021-08-04 ENCOUNTER — APPOINTMENT (OUTPATIENT)
Dept: BONE DENSITY | Facility: HOSPITAL | Age: 71
End: 2021-08-04

## 2021-08-04 ENCOUNTER — APPOINTMENT (OUTPATIENT)
Dept: MAMMOGRAPHY | Facility: HOSPITAL | Age: 71
End: 2021-08-04

## 2021-08-06 NOTE — TELEPHONE ENCOUNTER
Patient given an appointment to come in to office for labs.   Patient informed and states she was already informed of the result when she called in, states the last time she was in they did still see blood in her urine, states the culture results came back there was no growth but she wants to know if it showed blood. States she was told that extra steps would need to be taken and she is requesting a Urology referral that she states was previously discussed. Please advise. States she is Still taking the antibiotic and doesn't want to discontinue, not seeing any blood but still has the sensation though.

## 2021-09-27 DIAGNOSIS — E78.49 OTHER HYPERLIPIDEMIA: ICD-10-CM

## 2021-09-27 NOTE — TELEPHONE ENCOUNTER
PATIENT IS LEAVING [place] EARLY TOMORROW AND SHE DIDN'T HAVE ANY REFILLS ON HER MEDICATION AND SHE WOULD LIKE TO GET IT SENT TO THE     [address] [address] [phone]

## 2021-09-29 RX ORDER — ATORVASTATIN CALCIUM 10 MG/1
TABLET, FILM COATED ORAL
Qty: 90 TABLET | Refills: 0 | Status: SHIPPED | OUTPATIENT
Start: 2021-09-29 | End: 2021-09-29 | Stop reason: SDUPTHER

## 2021-09-29 RX ORDER — ATORVASTATIN CALCIUM 10 MG/1
10 TABLET, FILM COATED ORAL NIGHTLY
Qty: 90 TABLET | Refills: 0 | Status: SHIPPED | OUTPATIENT
Start: 2021-09-29 | End: 2022-08-10 | Stop reason: SDUPTHER

## 2021-10-27 ENCOUNTER — APPOINTMENT (OUTPATIENT)
Dept: BONE DENSITY | Facility: HOSPITAL | Age: 71
End: 2021-10-27

## 2021-10-27 ENCOUNTER — APPOINTMENT (OUTPATIENT)
Dept: MAMMOGRAPHY | Facility: HOSPITAL | Age: 71
End: 2021-10-27

## 2021-11-04 ENCOUNTER — TELEPHONE (OUTPATIENT)
Dept: FAMILY MEDICINE CLINIC | Facility: CLINIC | Age: 71
End: 2021-11-04

## 2021-11-04 DIAGNOSIS — S42.001A CLOSED DISPLACED FRACTURE OF RIGHT CLAVICLE, UNSPECIFIED PART OF CLAVICLE, INITIAL ENCOUNTER: Primary | ICD-10-CM

## 2021-11-04 NOTE — TELEPHONE ENCOUNTER
PLEASE CALL PATIENT BACK ASAP. SHE IS QUESTIONS ABOUT HER BROKEN COLLAR BONE. SHE WOULD LIKE TO SPEAK WITH YOU BEFORE TOMORROW MORNING..PLEASE

## 2021-11-04 NOTE — TELEPHONE ENCOUNTER
PATIENT CALLED TO FOLLOW UP REGARDING ORTHOPEDIC REFERRAL REQUEST.    PATIENT STATED SHE WOULD A CALL BACK BEFORE END OF DAY.      CALL BACK:  [phone]

## 2021-11-04 NOTE — TELEPHONE ENCOUNTER
Fell and broke collar bone in a lot of pain need a referral to ortho was seen in [place] was going to make a shavonne here and she said she is in a lot of pain she really wants to go to ortho straight away she was seen at     [facility]   11/3 at 1:30ish   Trying to call now to get records    Call her back on     [phone]  As soon as you make a decision on what she needs to do she said.

## 2021-11-08 ENCOUNTER — OFFICE VISIT (OUTPATIENT)
Dept: ORTHOPEDIC SURGERY | Facility: CLINIC | Age: 71
End: 2021-11-08

## 2021-11-08 VITALS
WEIGHT: 134 LBS | DIASTOLIC BLOOD PRESSURE: 90 MMHG | HEIGHT: 69 IN | SYSTOLIC BLOOD PRESSURE: 142 MMHG | BODY MASS INDEX: 19.85 KG/M2

## 2021-11-08 DIAGNOSIS — M89.8X1 PAIN OF RIGHT CLAVICLE: Primary | ICD-10-CM

## 2021-11-08 PROCEDURE — 99204 OFFICE O/P NEW MOD 45 MIN: CPT | Performed by: ORTHOPAEDIC SURGERY

## 2021-11-08 RX ORDER — HYDROCODONE BITARTRATE AND ACETAMINOPHEN 5; 325 MG/1; MG/1
TABLET ORAL
COMMUNITY
Start: 2021-11-03 | End: 2021-11-29

## 2021-11-08 NOTE — PROGRESS NOTES
[facility] Note    Subjective     CC: Pain of the Right Shoulder      HPI      Penny Delvalle is a 71 y.o. female who presents with new problem of: right clavicle pain.  Onset: mechanical fall. The issue has been ongoing for 6 day(s). Pain is a 8/10 on the pain scale. Pain is described as aching, throbbing and stabbing. Associated symptoms include pain and swelling. The pain is worse with walking, standing, sitting, climbing stairs, sleeping, lying on affected side and any movement of the joint; ice and pain medication and/or NSAID improve the pain. Previous treatments have included: bracing.    I have reviewed the following portions of the patient's history:History of Present Illness and review of systems.    She had been told in the past and osteopenia with a bone density test about 2 years ago.  She is retired.  She has a sling but it is too large.    Review of Systems   Constitutional: Negative.  Negative for chills, fatigue and fever.   HENT: Negative.  Negative for congestion and dental problem.    Eyes: Negative.  Negative for blurred vision.   Respiratory: Negative.  Negative for shortness of breath.    Cardiovascular: Negative.  Negative for leg swelling.   Gastrointestinal: Negative.  Negative for abdominal pain.   Endocrine: Negative.  Negative for polyuria.   Genitourinary: Negative.  Negative for difficulty urinating.   Musculoskeletal: Positive for arthralgias.   Skin: Negative.    Allergic/Immunologic: Negative.    Neurological: Negative.    Hematological: Negative.  Negative for adenopathy.   Psychiatric/Behavioral: Negative.  Negative for behavioral problems.       ROS:    Constiutional:Pt denies fever, chills, nausea, or vomiting.  MSK:as above      Objective      Past Medical History  Past Medical History:   Diagnosis Date   • Arthritis     rt shoulder    • History of kidney stones     x1 passed   • History of transfusion     3 units with ectopic pregnancy   • IBS  "(irritable bowel syndrome)    • Ovarian cyst 07/2017    found on CT after colonoscopy complication    • Perforation of colon as colonoscopy complication (HCC) 07/2017   • PONV (postoperative nausea and vomiting)     after colonoscopy in 2017 only    • Toenail fungus          Physical Exam  /90   Ht 174.6 cm (68.74\")   Wt 60.8 kg (134 lb)   BMI 19.94 kg/m²     Body mass index is 19.94 kg/m².    Patient is well nourished and well developed.        Ortho Exam  She has bruising and tenderness to the right clavicle.  Skin intact.    Imaging/Labs/EMG Reviewed:  Imaging Results (Last 24 Hours)     Procedure Component Value Units Date/Time    XR Clavicle Right [[id number]] Resulted: 11/08/21 0851     Updated: 11/08/21 0851    Narrative:      Right Clavicle X-Ray    Indication: Pain  AP and 10 degree cephalad view    Findings:  Right midshaft clavicle fracture with bone overlapping  No bony lesion  Normal soft tissues  Normal joint spaces    No prior studies were available for comparison.          Assessment:  1. Pain of right clavicle        Plan:  1. Recommend over the counter anti-inflammatories for pain and/or swelling  2. She was put in a new sling.  She will follow-up in 3 weeks with x-rays.    Follow Up:   Return in about 3 weeks (around 11/29/2021) for x-ray.      Medical Decision Making  Management Options : Moderate - 1 Acute Complicated Injury         Ronald Murcia M.D., FAAOS  Orthopedic Surgeon  Fellowship Trained Sports Medicine  [facility]  Orthopedics and Sports Medicine  [address], [address]  [place] [address]    EMR Dragon/Transcription disclaimer:  Much of this encounter note is an electronic transcription of spoken language to printed text. Electronic transcription of spoken language may permit erroneous, or at times, nonsensical words or phrases to be inadvertently transcribed. Although I have reviewed the note for such errors, some may still exist.  "

## 2021-11-29 ENCOUNTER — OFFICE VISIT (OUTPATIENT)
Dept: ORTHOPEDIC SURGERY | Facility: CLINIC | Age: 71
End: 2021-11-29

## 2021-11-29 VITALS
DIASTOLIC BLOOD PRESSURE: 69 MMHG | HEIGHT: 69 IN | BODY MASS INDEX: 19.85 KG/M2 | WEIGHT: 134.04 LBS | SYSTOLIC BLOOD PRESSURE: 114 MMHG

## 2021-11-29 DIAGNOSIS — M89.8X1 PAIN OF RIGHT CLAVICLE: Primary | ICD-10-CM

## 2021-11-29 DIAGNOSIS — S42.021D CLOSED DISPLACED FRACTURE OF SHAFT OF RIGHT CLAVICLE WITH ROUTINE HEALING, SUBSEQUENT ENCOUNTER: ICD-10-CM

## 2021-11-29 PROCEDURE — 99213 OFFICE O/P EST LOW 20 MIN: CPT | Performed by: ORTHOPAEDIC SURGERY

## 2021-11-29 RX ORDER — IBUPROFEN 200 MG
200 TABLET ORAL EVERY 6 HOURS PRN
COMMUNITY

## 2021-11-29 RX ORDER — ACETAMINOPHEN 325 MG/1
650 TABLET ORAL EVERY 6 HOURS PRN
COMMUNITY

## 2021-11-29 NOTE — PROGRESS NOTES
"      [facility] [place] Note    Subjective     CC: Follow-up (3 week follow up; Pain of right clavicle (DOI: Approximately 11/2/21))      HPI    Penny Delvalle is a 71 y.o. female.  She is follow-up right clavicle fracture.  Doing well.  No new complaints.    Review of Systems   Constitutional: Positive for fatigue.   HENT: Negative.    Eyes: Negative.    Respiratory: Negative.    Cardiovascular: Negative.    Gastrointestinal: Positive for diarrhea.   Endocrine: Negative.    Genitourinary: Negative.    Musculoskeletal: Positive for arthralgias, neck pain and neck stiffness.   Skin: Negative.    Allergic/Immunologic: Negative.    Neurological: Negative.    Hematological: Negative.    Psychiatric/Behavioral: Positive for sleep disturbance.       ROS:    Constiutional:Pt denies fever, chills, nausea, or vomiting.  MSK:as above      Objective      Past Medical History  Past Medical History:   Diagnosis Date   • Arthritis     rt shoulder    • History of kidney stones     x1 passed   • History of transfusion     3 units with ectopic pregnancy   • IBS (irritable bowel syndrome)    • Ovarian cyst 07/2017    found on CT after colonoscopy complication    • Perforation of colon as colonoscopy complication (HCC) 07/2017   • PONV (postoperative nausea and vomiting)     after colonoscopy in 2017 only    • Toenail fungus          Physical Exam  /69   Ht 174.6 cm (68.74\")   Wt 60.8 kg (134 lb 0.6 oz)   BMI 19.94 kg/m²     Body mass index is 19.94 kg/m².    Patient is well nourished and well developed.        Ortho Exam  Normal tenderness at fracture site.  Neurologically intact.    Imaging/Labs/EMG Reviewed:  Imaging Results (Last 24 Hours)     Procedure Component Value Units Date/Time    XR Clavicle Right [[id number]] Resulted: 11/29/21 1108     Updated: 11/29/21 1115    Narrative:      Right Clavicle X-Ray    Indication: Pain  AP and 10 degree cephalad view    Findings:  Healing right clavicle fracture  No " bony lesion  Normal soft tissues  Normal joint spaces     prior studies were available for comparison.          Assessment:  1. Pain of right clavicle    2. Closed displaced fracture of shaft of right clavicle with routine healing, subsequent encounter        Plan:  1. Recommend over the counter anti-inflammatories for pain and/or swelling  2. She will continue with sling for 2 more weeks.  Follow-up x-rays then.  Physical therapy after that at [facility] in [place]    Follow Up:   Return in about 2 weeks (around 12/13/2021).      Medical Decision Making  Management Options : Low - OTC Drugs        Ronald Murcia M.D., [facility]  Orthopedic Surgeon  Fellowship Trained Sports Medicine  [facility]  Orthopedics and Sports Medicine  [address], [address]  [place] [address]    EMR Dragon/Transcription disclaimer:  Much of this encounter note is an electronic transcription of spoken language to printed text. Electronic transcription of spoken language may permit erroneous, or at times, nonsensical words or phrases to be inadvertently transcribed. Although I have reviewed the note for such errors, some may still exist.

## 2021-12-15 ENCOUNTER — OFFICE VISIT (OUTPATIENT)
Dept: ORTHOPEDIC SURGERY | Facility: CLINIC | Age: 71
End: 2021-12-15

## 2021-12-15 VITALS
HEART RATE: 66 BPM | HEIGHT: 69 IN | BODY MASS INDEX: 19.85 KG/M2 | WEIGHT: 134 LBS | SYSTOLIC BLOOD PRESSURE: 137 MMHG | DIASTOLIC BLOOD PRESSURE: 87 MMHG

## 2021-12-15 DIAGNOSIS — S42.021D CLOSED DISPLACED FRACTURE OF SHAFT OF RIGHT CLAVICLE WITH ROUTINE HEALING, SUBSEQUENT ENCOUNTER: Primary | ICD-10-CM

## 2021-12-15 PROCEDURE — 99213 OFFICE O/P EST LOW 20 MIN: CPT | Performed by: ORTHOPAEDIC SURGERY

## 2021-12-15 NOTE — PROGRESS NOTES
"      [facility] Note    Subjective     CC: Follow-up (2 weel f/u--Closed displaced fracture of shaft of right clavicle 11/2/21)      HPI    Penny Delvalle is a 71 y.o. female.  She is doing well with no complaints.    Review of Systems   Constitutional: Negative.  Negative for chills, fatigue and fever.   HENT: Negative.  Negative for congestion and dental problem.    Eyes: Negative.  Negative for blurred vision.   Respiratory: Negative.  Negative for shortness of breath.    Cardiovascular: Negative.  Negative for leg swelling.   Gastrointestinal: Negative.  Negative for abdominal pain.   Endocrine: Negative.  Negative for polyuria.   Genitourinary: Negative.  Negative for difficulty urinating.   Musculoskeletal: Positive for arthralgias.   Skin: Negative.    Allergic/Immunologic: Negative.    Neurological: Negative.    Hematological: Negative.  Negative for adenopathy.   Psychiatric/Behavioral: Negative.  Negative for behavioral problems.       ROS:    Constiutional:Pt denies fever, chills, nausea, or vomiting.  MSK:as above      Objective      Past Medical History  Past Medical History:   Diagnosis Date   • Arthritis     rt shoulder    • History of kidney stones     x1 passed   • History of transfusion     3 units with ectopic pregnancy   • IBS (irritable bowel syndrome)    • Ovarian cyst 07/2017    found on CT after colonoscopy complication    • Perforation of colon as colonoscopy complication (HCC) 07/2017   • PONV (postoperative nausea and vomiting)     after colonoscopy in 2017 only    • Toenail fungus          Physical Exam  /87   Pulse 66   Ht 174.6 cm (68.74\")   Wt 60.8 kg (134 lb)   BMI 19.94 kg/m²     Body mass index is 19.94 kg/m².    Patient is well nourished and well developed.        Ortho Exam  Clavicle feels well with no bump    Imaging/Labs/EMG Reviewed:  Imaging Results (Last 24 Hours)     Procedure Component Value Units Date/Time    XR Clavicle Right [[id number]] " Resulted: 12/15/21 1133     Updated: 12/15/21 1138    Narrative:      Right Clavicle X-Ray    Indication: Pain  AP and 10 degree cephalad view    Findings:  Healing right clavicle fracture  No bony lesion  Normal soft tissues  Normal joint spaces    prior studies were available for comparison.          Assessment:  1. Closed displaced fracture of shaft of right clavicle with routine healing, subsequent encounter        Plan:  1. Recommend over the counter anti-inflammatories for pain and/or swelling  2. Start physical therapy for range of motion.  Follow-up in 1 month.    Follow Up:   Return in about 1 month (around 1/15/2022) for x-ray.      Medical Decision Making  Management Options : Low - OT or PT Therapy         Ronald Murcia M.D., [facility]  Orthopedic Surgeon  Fellowship Trained Sports Medicine  [facility]  Orthopedics and Sports Medicine  [address] [place] [address], [address]  [place] [address]    EMR Dragon/Transcription disclaimer:  Much of this encounter note is an electronic transcription of spoken language to printed text. Electronic transcription of spoken language may permit erroneous, or at times, nonsensical words or phrases to be inadvertently transcribed. Although I have reviewed the note for such errors, some may still exist.

## 2022-01-19 ENCOUNTER — OFFICE VISIT (OUTPATIENT)
Dept: ORTHOPEDIC SURGERY | Facility: CLINIC | Age: 72
End: 2022-01-19

## 2022-01-19 VITALS
SYSTOLIC BLOOD PRESSURE: 111 MMHG | BODY MASS INDEX: 22.51 KG/M2 | WEIGHT: 152 LBS | DIASTOLIC BLOOD PRESSURE: 78 MMHG | HEIGHT: 69 IN

## 2022-01-19 DIAGNOSIS — S42.021D CLOSED DISPLACED FRACTURE OF SHAFT OF RIGHT CLAVICLE WITH ROUTINE HEALING, SUBSEQUENT ENCOUNTER: Primary | ICD-10-CM

## 2022-01-19 PROCEDURE — 99213 OFFICE O/P EST LOW 20 MIN: CPT | Performed by: ORTHOPAEDIC SURGERY

## 2022-01-19 NOTE — PROGRESS NOTES
"      [facility] Orthopaedic Surgery Clinic Note    Subjective     CC: Follow-up (5 week f/u --Closed displaced fracture of shaft of right clavicle 11/2/21)      HPI    Penny Delvalle is a 71 y.o. female.  She is doing well.  She will start attending physical therapy.  She feels better.  She forgets at times that she had a fracture  Review of Systems   Constitutional: Negative.  Negative for chills, fatigue and fever.   HENT: Negative.  Negative for congestion and dental problem.    Eyes: Negative.  Negative for blurred vision.   Respiratory: Negative.  Negative for shortness of breath.    Cardiovascular: Negative.  Negative for leg swelling.   Gastrointestinal: Negative.  Negative for abdominal pain.   Endocrine: Negative.  Negative for polyuria.   Genitourinary: Negative.  Negative for difficulty urinating.   Musculoskeletal: Positive for arthralgias.   Skin: Negative.    Allergic/Immunologic: Negative.    Neurological: Negative.    Hematological: Negative.  Negative for adenopathy.   Psychiatric/Behavioral: Negative.  Negative for behavioral problems.       ROS:    Constiutional:Pt denies fever, chills, nausea, or vomiting.  MSK:as above      Objective      Past Medical History  Past Medical History:   Diagnosis Date   • Arthritis     rt shoulder    • History of kidney stones     x1 passed   • History of transfusion     3 units with ectopic pregnancy   • IBS (irritable bowel syndrome)    • Ovarian cyst 07/2017    found on CT after colonoscopy complication    • Perforation of colon as colonoscopy complication (HCC) 07/2017   • PONV (postoperative nausea and vomiting)     after colonoscopy in 2017 only    • Toenail fungus          Physical Exam  /78   Ht 174.6 cm (68.74\")   Wt 68.9 kg (152 lb)   BMI 22.62 kg/m²     Body mass index is 22.62 kg/m².    Patient is well nourished and well developed.        Ortho Exam  She has full motion full-strength.  No tenderness    Imaging/Labs/EMG Reviewed:  Imaging Results " (Last 24 Hours)     Procedure Component Value Units Date/Time    XR Clavicle Right [[id number]] Resulted: 01/19/22 1122     Updated: 01/19/22 1122    Narrative:      Right Clavicle X-Ray    Indication: Pain  AP and 10 degree cephalad view    Findings:  Healing right clavicle fracture  No bony lesion  Normal soft tissues  Normal joint spaces    prior studies were available for comparison.          Assessment:  1. Closed displaced fracture of shaft of right clavicle with routine healing, subsequent encounter        Plan:  1. Recommend over the counter anti-inflammatories for pain and/or swelling  2. Is healing well.  She will finish physical therapy.  Follow-up as needed    Follow Up:   Return if symptoms worsen or fail to improve.      Medical Decision Making  Management Options : Low - OT or PT Therapy         Ronald Murcia M.D., [facility]  Orthopedic Surgeon  Fellowship Trained Sports Medicine  [facility]  Orthopedics and Sports Medicine  [address] [place] [address], [address]  [place] [address]    EMR Dragon/Transcription disclaimer:  Much of this encounter note is an electronic transcription of spoken language to printed text. Electronic transcription of spoken language may permit erroneous, or at times, nonsensical words or phrases to be inadvertently transcribed. Although I have reviewed the note for such errors, some may still exist.

## 2022-07-05 ENCOUNTER — TELEPHONE (OUTPATIENT)
Dept: FAMILY MEDICINE CLINIC | Facility: CLINIC | Age: 72
End: 2022-07-05

## 2022-07-05 NOTE — TELEPHONE ENCOUNTER
Suggested [facility] since they have xray tech on staff. Pt aware the two Providers here don't have any openings.

## 2022-07-05 NOTE — TELEPHONE ENCOUNTER
Pt called in and stated that she broke her left foot, her right of middle toe is purple up to about half of her foot, she said she would walk on it for about 45 minutes later. Said she stepped outside to take pillows and heard it snap. Hurts to put pressure on her foot, said she scooted to the bathroom this morning.     She stated if she cant be worked in today, if someone can call her to let her know what needs to be down

## 2022-08-10 ENCOUNTER — OFFICE VISIT (OUTPATIENT)
Dept: FAMILY MEDICINE CLINIC | Facility: CLINIC | Age: 72
End: 2022-08-10

## 2022-08-10 VITALS
WEIGHT: 161 LBS | SYSTOLIC BLOOD PRESSURE: 116 MMHG | BODY MASS INDEX: 23.85 KG/M2 | HEART RATE: 68 BPM | TEMPERATURE: 98 F | DIASTOLIC BLOOD PRESSURE: 80 MMHG | OXYGEN SATURATION: 99 % | HEIGHT: 69 IN | RESPIRATION RATE: 16 BRPM

## 2022-08-10 DIAGNOSIS — R20.2 LEFT HAND PARESTHESIA: Primary | ICD-10-CM

## 2022-08-10 DIAGNOSIS — R79.89 OTHER SPECIFIED ABNORMAL FINDINGS OF BLOOD CHEMISTRY: ICD-10-CM

## 2022-08-10 DIAGNOSIS — E78.49 OTHER HYPERLIPIDEMIA: ICD-10-CM

## 2022-08-10 LAB
ALBUMIN SERPL-MCNC: 4.5 G/DL (ref 3.5–5.2)
ALBUMIN/GLOB SERPL: 2.1 G/DL
ALP SERPL-CCNC: 72 U/L (ref 39–117)
ALT SERPL-CCNC: 17 U/L (ref 1–33)
AST SERPL-CCNC: 20 U/L (ref 1–32)
BASOPHILS # BLD AUTO: 0.03 10*3/MM3 (ref 0–0.2)
BASOPHILS NFR BLD AUTO: 0.5 % (ref 0–1.5)
BILIRUB SERPL-MCNC: 1 MG/DL (ref 0–1.2)
BUN SERPL-MCNC: 13 MG/DL (ref 8–23)
BUN/CREAT SERPL: 16.7 (ref 7–25)
CALCIUM SERPL-MCNC: 9.6 MG/DL (ref 8.6–10.5)
CHLORIDE SERPL-SCNC: 106 MMOL/L (ref 98–107)
CHOLEST SERPL-MCNC: 263 MG/DL (ref 0–200)
CHOLEST/HDLC SERPL: 4.46 {RATIO}
CO2 SERPL-SCNC: 26.4 MMOL/L (ref 22–29)
CREAT SERPL-MCNC: 0.78 MG/DL (ref 0.57–1)
EGFRCR SERPLBLD CKD-EPI 2021: 81.8 ML/MIN/1.73
EOSINOPHIL # BLD AUTO: 0.09 10*3/MM3 (ref 0–0.4)
EOSINOPHIL NFR BLD AUTO: 1.4 % (ref 0.3–6.2)
ERYTHROCYTE [DISTWIDTH] IN BLOOD BY AUTOMATED COUNT: 12.7 % (ref 12.3–15.4)
GLOBULIN SER CALC-MCNC: 2.1 GM/DL
GLUCOSE SERPL-MCNC: 97 MG/DL (ref 65–99)
HBA1C MFR BLD: 5.9 % (ref 4.8–5.6)
HCT VFR BLD AUTO: 42.1 % (ref 34–46.6)
HDLC SERPL-MCNC: 59 MG/DL (ref 40–60)
HGB BLD-MCNC: 14.1 G/DL (ref 12–15.9)
IMM GRANULOCYTES # BLD AUTO: 0.04 10*3/MM3 (ref 0–0.05)
IMM GRANULOCYTES NFR BLD AUTO: 0.6 % (ref 0–0.5)
LDLC SERPL CALC-MCNC: 150 MG/DL (ref 0–100)
LYMPHOCYTES # BLD AUTO: 1.39 10*3/MM3 (ref 0.7–3.1)
LYMPHOCYTES NFR BLD AUTO: 21.8 % (ref 19.6–45.3)
MCH RBC QN AUTO: 31.3 PG (ref 26.6–33)
MCHC RBC AUTO-ENTMCNC: 33.5 G/DL (ref 31.5–35.7)
MCV RBC AUTO: 93.3 FL (ref 79–97)
MONOCYTES # BLD AUTO: 0.56 10*3/MM3 (ref 0.1–0.9)
MONOCYTES NFR BLD AUTO: 8.8 % (ref 5–12)
NEUTROPHILS # BLD AUTO: 4.26 10*3/MM3 (ref 1.7–7)
NEUTROPHILS NFR BLD AUTO: 66.9 % (ref 42.7–76)
NRBC BLD AUTO-RTO: 0 /100 WBC (ref 0–0.2)
PLATELET # BLD AUTO: 274 10*3/MM3 (ref 140–450)
POTASSIUM SERPL-SCNC: 4.8 MMOL/L (ref 3.5–5.2)
PROT SERPL-MCNC: 6.6 G/DL (ref 6–8.5)
RBC # BLD AUTO: 4.51 10*6/MM3 (ref 3.77–5.28)
SODIUM SERPL-SCNC: 143 MMOL/L (ref 136–145)
TRIGL SERPL-MCNC: 297 MG/DL (ref 0–150)
TSH SERPL DL<=0.005 MIU/L-ACNC: 3.18 UIU/ML (ref 0.27–4.2)
VIT B12 SERPL-MCNC: 317 PG/ML (ref 211–946)
VLDLC SERPL CALC-MCNC: 54 MG/DL (ref 5–40)
WBC # BLD AUTO: 6.37 10*3/MM3 (ref 3.4–10.8)

## 2022-08-10 PROCEDURE — 99214 OFFICE O/P EST MOD 30 MIN: CPT | Performed by: FAMILY MEDICINE

## 2022-08-10 PROCEDURE — 93005 ELECTROCARDIOGRAM TRACING: CPT | Performed by: FAMILY MEDICINE

## 2022-08-10 RX ORDER — ATORVASTATIN CALCIUM 10 MG/1
10 TABLET, FILM COATED ORAL NIGHTLY
Qty: 90 TABLET | Refills: 3 | Status: SHIPPED | OUTPATIENT
Start: 2022-08-10

## 2022-08-10 RX ORDER — PREDNISONE 10 MG/1
TABLET ORAL
Qty: 21 TABLET | Refills: 0 | Status: SHIPPED | OUTPATIENT
Start: 2022-08-10

## 2022-08-10 NOTE — PROGRESS NOTES
"Chief Complaint  Tingling in L hand/fingers  ('Feels like pens and needles, constant x 4d' Caused by lack of statin med? Didn't take while sick w/ Covid. )    Subjective          Penny Delvalle presents to [facility]  History of Present Illness  She recently had Covid infection 12 days ago  4 days ago, she developed pins and needles left hand  Tingling is mainly in digits 3-5 left hand, particularly 4-5. Constant, unchanging.   Denies weakness in left hand   While she had Covid, she held atorvastatin. Wondering if this caused the tingling in her fingers.     The following portions of the patient's history were reviewed and updated as appropriate: allergies, current medications, past family history, past medical history, past social history, past surgical history and problem list.    Objective   Vital Signs:   /80   Pulse 68   Temp 98 °F (36.7 °C)   Resp 16   Ht 174.6 cm (68.74\")   Wt 73 kg (161 lb)   SpO2 99%   BMI 23.96 kg/m²     Physical Exam  Vitals and nursing note reviewed.   Constitutional:       Appearance: Normal appearance. She is well-developed. She is not ill-appearing.   HENT:      Head: Normocephalic and atraumatic.      Right Ear: External ear normal.      Left Ear: External ear normal.   Eyes:      Conjunctiva/sclera: Conjunctivae normal.   Cardiovascular:      Rate and Rhythm: Normal rate and regular rhythm.      Heart sounds: Normal heart sounds. No murmur heard.  Pulmonary:      Effort: Pulmonary effort is normal.      Breath sounds: Normal breath sounds.   Musculoskeletal:         General: No deformity.      Left hand: No swelling or tenderness. Normal range of motion.      Cervical back: Neck supple. No tenderness.   Skin:     General: Skin is warm.   Neurological:      Mental Status: She is alert and oriented to person, place, and time.   Psychiatric:         Behavior: Behavior normal.        Result Review :            ECG 12 Lead    Date/Time: " 8/10/2022 11:21 AM  Performed by: Genie Tian DO  Authorized by: Genie Tian DO   Comparison: not compared with previous ECG   Previous ECG: no previous ECG available  Rhythm: sinus rhythm  Rate: normal  BPM: 65  Conduction: conduction normal  ST Segments: ST segments normal  T Waves: T waves normal  QRS axis: normal    Clinical impression: normal ECG              Assessment and Plan    Diagnoses and all orders for this visit:    1. Left hand paresthesia (Primary)  -     ECG 12 Lead  -     Comprehensive Metabolic Panel  -     CBC & Differential  -     Lipid Panel With / Chol / HDL Ratio  -     TSH Rfx On Abnormal To Free T4  -     Vitamin B12  -     Hemoglobin A1c  -     predniSONE (DELTASONE) 10 MG tablet; Take 60 mg po day 1, 50 mg day 2, 40 mg day 3, 30 mg day 4, 20 mg day 5, 10 mg day 6  Dispense: 21 tablet; Refill: 0    2. Other hyperlipidemia  -     Comprehensive Metabolic Panel  -     CBC & Differential  -     Lipid Panel With / Chol / HDL Ratio  -     TSH Rfx On Abnormal To Free T4  -     Vitamin B12  -     atorvastatin (LIPITOR) 10 MG tablet; Take 1 tablet by mouth Every Night.  Dispense: 90 tablet; Refill: 3  -     Hemoglobin A1c    3. Other specified abnormal findings of blood chemistry   -     Hemoglobin A1c    Labs completed today to evaluate symptoms and chronic conditions  Steroid taper to improve paresthesia. If ineffective and normal labs, consider EMG/NCV      Follow Up   Return if symptoms worsen or fail to improve.  Patient was given instructions and counseling regarding her condition or for health maintenance advice. Please see specific information pulled into the AVS if appropriate.

## 2022-08-12 ENCOUNTER — TELEPHONE (OUTPATIENT)
Dept: FAMILY MEDICINE CLINIC | Facility: CLINIC | Age: 72
End: 2022-08-12

## 2022-08-12 NOTE — TELEPHONE ENCOUNTER
Caller: Penny Delvalle    Relationship: Self    Best call back number: [phone]    Caller requesting test results: KEYONNA    What test was performed: BLOOD TESTS    When was the test performed: 08/10/22    Where was the test performed: IN OFFICE     Additional notes: CALLING TO HAVE RESULTS EXPLAINED.  COME RESULTS ARE CONCERNING.    3 TESTS HAVE FLAGS.

## 2022-08-17 DIAGNOSIS — E78.49 OTHER HYPERLIPIDEMIA: Primary | ICD-10-CM

## 2022-08-18 DIAGNOSIS — R20.2 LEFT HAND PARESTHESIA: Primary | ICD-10-CM

## 2022-09-27 ENCOUNTER — LAB (OUTPATIENT)
Dept: FAMILY MEDICINE CLINIC | Facility: CLINIC | Age: 72
End: 2022-09-27

## 2022-09-27 DIAGNOSIS — E78.49 OTHER HYPERLIPIDEMIA: ICD-10-CM

## 2022-09-28 LAB
ALBUMIN SERPL-MCNC: 4.4 G/DL (ref 3.5–5.2)
ALBUMIN/GLOB SERPL: 2.3 G/DL
ALP SERPL-CCNC: 64 U/L (ref 39–117)
ALT SERPL-CCNC: 13 U/L (ref 1–33)
AST SERPL-CCNC: 17 U/L (ref 1–32)
BILIRUB SERPL-MCNC: 0.8 MG/DL (ref 0–1.2)
BUN SERPL-MCNC: 14 MG/DL (ref 8–23)
BUN/CREAT SERPL: 15.6 (ref 7–25)
CALCIUM SERPL-MCNC: 9.3 MG/DL (ref 8.6–10.5)
CHLORIDE SERPL-SCNC: 108 MMOL/L (ref 98–107)
CHOLEST SERPL-MCNC: 206 MG/DL (ref 0–200)
CHOLEST/HDLC SERPL: 2.94 {RATIO}
CO2 SERPL-SCNC: 26.1 MMOL/L (ref 22–29)
CREAT SERPL-MCNC: 0.9 MG/DL (ref 0.57–1)
EGFRCR SERPLBLD CKD-EPI 2021: 68.9 ML/MIN/1.73
GLOBULIN SER CALC-MCNC: 1.9 GM/DL
GLUCOSE SERPL-MCNC: 96 MG/DL (ref 65–99)
HDLC SERPL-MCNC: 70 MG/DL (ref 40–60)
LDLC SERPL CALC-MCNC: 114 MG/DL (ref 0–100)
POTASSIUM SERPL-SCNC: 4.5 MMOL/L (ref 3.5–5.2)
PROT SERPL-MCNC: 6.3 G/DL (ref 6–8.5)
SODIUM SERPL-SCNC: 144 MMOL/L (ref 136–145)
TRIGL SERPL-MCNC: 124 MG/DL (ref 0–150)
VLDLC SERPL CALC-MCNC: 22 MG/DL (ref 5–40)

## 2022-10-13 ENCOUNTER — HOSPITAL ENCOUNTER (OUTPATIENT)
Dept: NEUROLOGY | Facility: HOSPITAL | Age: 72
Discharge: HOME | End: 2022-10-13

## 2023-09-21 ENCOUNTER — OFFICE VISIT (OUTPATIENT)
Dept: FAMILY MEDICINE CLINIC | Facility: CLINIC | Age: 73
End: 2023-09-21
Payer: MEDICARE

## 2023-09-21 VITALS
WEIGHT: 156.6 LBS | DIASTOLIC BLOOD PRESSURE: 66 MMHG | TEMPERATURE: 96.9 F | SYSTOLIC BLOOD PRESSURE: 102 MMHG | HEART RATE: 66 BPM | HEIGHT: 69 IN | BODY MASS INDEX: 23.19 KG/M2 | OXYGEN SATURATION: 96 % | RESPIRATION RATE: 16 BRPM

## 2023-09-21 DIAGNOSIS — R73.03 PREDIABETES: ICD-10-CM

## 2023-09-21 DIAGNOSIS — M81.0 AGE-RELATED OSTEOPOROSIS WITHOUT CURRENT PATHOLOGICAL FRACTURE: ICD-10-CM

## 2023-09-21 DIAGNOSIS — K58.2 IRRITABLE BOWEL SYNDROME WITH BOTH CONSTIPATION AND DIARRHEA: ICD-10-CM

## 2023-09-21 DIAGNOSIS — Z12.31 ENCOUNTER FOR SCREENING MAMMOGRAM FOR MALIGNANT NEOPLASM OF BREAST: ICD-10-CM

## 2023-09-21 DIAGNOSIS — Z00.00 MEDICARE ANNUAL WELLNESS VISIT, SUBSEQUENT: Primary | ICD-10-CM

## 2023-09-21 DIAGNOSIS — Z78.0 POSTMENOPAUSAL: ICD-10-CM

## 2023-09-21 DIAGNOSIS — Z23 FLU VACCINE NEED: ICD-10-CM

## 2023-09-21 DIAGNOSIS — E78.49 OTHER HYPERLIPIDEMIA: ICD-10-CM

## 2023-09-21 RX ORDER — ATORVASTATIN CALCIUM 10 MG/1
10 TABLET, FILM COATED ORAL NIGHTLY
Qty: 90 TABLET | Refills: 0 | Status: SHIPPED | OUTPATIENT
Start: 2023-09-21

## 2023-09-21 NOTE — PATIENT INSTRUCTIONS
Medicare Wellness  Personal Prevention Plan of Service     Date of Office Visit:    Encounter Provider:  Genie Tian DO  Place of Service:  [facility]  Patient Name: Penny Delvalle  :  1950    As part of the Medicare Wellness portion of your visit today, we are providing you with this personalized preventive plan of services (PPPS). This plan is based upon recommendations of the [place] Preventive Services Task Force (USPSTF) and the Advisory Committee on Immunization Practices (ACIP).    This lists the preventive care services that should be considered, and provides dates of when you are due. Items listed as completed are up-to-date and do not require any further intervention.    Health Maintenance   Topic Date Due    MAMMOGRAM  2020    DXA SCAN  2021    COVID-19 Vaccine (4 - Pfizer series) 2022    LIPID PANEL  2023    INFLUENZA VACCINE  10/01/2023    ANNUAL WELLNESS VISIT  2024    COLORECTAL CANCER SCREENING  2027    HEPATITIS C SCREENING  Completed    Pneumococcal Vaccine 65+  Completed    TDAP/TD VACCINES  Discontinued    ZOSTER VACCINE  Discontinued       Orders Placed This Encounter   Procedures    Mammo Screening Digital Tomosynthesis Bilateral With CAD     Standing Status:   Future     Order Specific Question:   Reason for Exam:     Answer:   screening     Order Specific Question:   Release to patient     Answer:   Routine Release [[id number]]    DEXA Bone Density Axial     Standing Status:   Future     Order Specific Question:   Reason for Exam:     Answer:   screening     Order Specific Question:   Release to patient     Answer:   Routine Release [[id number]]    Fluzone High-Dose 65+yrs    Comprehensive Metabolic Panel     Standing Status:   Future     Standing Expiration Date:   2024     Order Specific Question:   Release to patient     Answer:   Routine Release [[id number]]    Hemoglobin A1c     Standing  Status:   Future     Standing Expiration Date:   9/21/2024     Order Specific Question:   Release to patient     Answer:   Routine Release [[id number]]    Lipid Panel With / Chol / HDL Ratio     Standing Status:   Future     Standing Expiration Date:   9/21/2024     Order Specific Question:   Release to patient     Answer:   Routine Release [[id number]]    Vitamin D,25-Hydroxy     Standing Status:   Future     Standing Expiration Date:   9/21/2024     Order Specific Question:   Release to patient     Answer:   Routine Release [[id number]]    Ambulatory Referral to Gastroenterology     Referral Priority:   Routine     Referral Type:   Consultation     Referral Reason:   Specialty Services Required     Requested Specialty:   Gastroenterology     Number of Visits Requested:   1    CBC & Differential     Standing Status:   Future     Standing Expiration Date:   9/21/2024     Order Specific Question:   Manual Differential     Answer:   No     Order Specific Question:   Release to patient     Answer:   Routine Release [[id number]]       Return in about 1 year (around 9/21/2024) for Medicare Wellness.

## 2023-09-21 NOTE — PROGRESS NOTES
The ABCs of the Annual Wellness Visit  Subsequent Medicare Wellness Visit    Subjective    Penny Delvalle is a 73 y.o. female who presents for a Subsequent Medicare Wellness Visit.    The following portions of the patient's history were reviewed and   updated as appropriate: allergies, current medications, past family history, past medical history, past social history, past surgical history, and problem list.    Compared to one year ago, the patient feels her physical   health is the same.    Compared to one year ago, the patient feels her mental   health is the same.    Recent Hospitalizations:  She was not admitted to the hospital during the last year.       Current Medical Providers:  Patient Care Team:  Genie Tian,  as PCP - General (Family Medicine)    Outpatient Medications Prior to Visit   Medication Sig Dispense Refill    acetaminophen (TYLENOL) 325 MG tablet Take 2 tablets by mouth Every 6 (Six) Hours As Needed for Mild Pain.      ibuprofen (ADVIL,MOTRIN) 200 MG tablet Take 1 tablet by mouth Every 6 (Six) Hours As Needed for Mild Pain.      atorvastatin (LIPITOR) 10 MG tablet Take 1 tablet by mouth Every Night. 90 tablet 3    predniSONE (DELTASONE) 10 MG tablet Take 60 mg po day 1, 50 mg day 2, 40 mg day 3, 30 mg day 4, 20 mg day 5, 10 mg day 6 21 tablet 0     No facility-administered medications prior to visit.       No opioid medication identified on active medication list. I have reviewed chart for other potential  high risk medication/s and harmful drug interactions in the elderly.        Aspirin is not on active medication list.  Aspirin use is not indicated based on review of current medical condition/s. Risk of harm outweighs potential benefits.  .    Patient Active Problem List   Diagnosis    Ovarian cyst    Age-related osteoporosis without current pathological fracture     Advance Care Planning   Advance Care Planning     Advance Directive is not on file.  ACP discussion was held with  "the patient during this visit. Patient does not have an advance directive, information provided.     Objective    Vitals:    23 1001   BP: 102/66   Pulse: 66   Resp: 16   Temp: 96.9 °F (36.1 °C)   SpO2: 96%   Weight: 71 kg (156 lb 9.6 oz)   Height: 174.6 cm (68.75\")   PainSc: 0-No pain     Estimated body mass index is 23.29 kg/m² as calculated from the following:    Height as of this encounter: 174.6 cm (68.75\").    Weight as of this encounter: 71 kg (156 lb 9.6 oz).    BMI is within normal parameters. No other follow-up for BMI required.      Does the patient have evidence of cognitive impairment? No          HEALTH RISK ASSESSMENT    Smoking Status:  Social History     Tobacco Use   Smoking Status Former    Packs/day: 0.10    Years: 4.00    Pack years: 0.40    Types: Cigarettes    Quit date: 1974    Years since quittin.7   Smokeless Tobacco Never     Alcohol Consumption:  Social History     Substance and Sexual Activity   Alcohol Use Yes    Alcohol/week: 14.0 standard drinks    Types: 14 Glasses of wine per week    Comment: 2-3 glasses of wine or bourbon daily      Fall Risk Screen:    RAJAN Fall Risk Assessment was completed, and patient is at LOW risk for falls.Assessment completed on:2023    Depression Screenin/21/2023    10:02 AM   PHQ-2/PHQ-9 Depression Screening   Little Interest or Pleasure in Doing Things 0-->not at all   Feeling Down, Depressed or Hopeless 0-->not at all   PHQ-9: Brief Depression Severity Measure Score 0       Health Habits and Functional and Cognitive Screenin/21/2023    10:01 AM   Functional & Cognitive Status   Do you have difficulty preparing food and eating? No   Do you have difficulty bathing yourself, getting dressed or grooming yourself? No   Do you have difficulty using the toilet? No   Do you have difficulty moving around from place to place? No   Do you have trouble with steps or getting out of a bed or a chair? No   Current Diet Well " Balanced Diet   Dental Exam Not up to date   Eye Exam Not up to date   Exercise (times per week) 7 times per week   Current Exercises Include House Cleaning;Yard Work;Gardening   Do you need help using the phone?  No   Are you deaf or do you have serious difficulty hearing?  No   Do you need help to go to places out of walking distance? No   Do you need help shopping? No   Do you need help preparing meals?  No   Do you need help with housework?  No   Do you need help with laundry? No   Do you need help taking your medications? No   Do you need help managing money? No   Do you ever drive or ride in a car without wearing a seat belt? No   Have you felt unusual stress, anger or loneliness in the last month? No   Who do you live with? Spouse   If you need help, do you have trouble finding someone available to you? No   Have you been bothered in the last four weeks by sexual problems? No   Do you have difficulty concentrating, remembering or making decisions? No       Age-appropriate Screening Schedule:  Refer to the list below for future screening recommendations based on patient's age, sex and/or medical conditions. Orders for these recommended tests are listed in the plan section. The patient has been provided with a written plan.    Health Maintenance   Topic Date Due    MAMMOGRAM  09/12/2020    DXA SCAN  08/19/2021    COVID-19 Vaccine (4 - Pfizer series) 01/19/2022    LIPID PANEL  09/27/2023    INFLUENZA VACCINE  10/01/2023    ANNUAL WELLNESS VISIT  09/21/2024    COLORECTAL CANCER SCREENING  07/27/2027    HEPATITIS C SCREENING  Completed    Pneumococcal Vaccine 65+  Completed    TDAP/TD VACCINES  Discontinued    ZOSTER VACCINE  Discontinued                  CMS Preventative Services Quick Reference  Risk Factors Identified During Encounter  Immunizations Discussed/Encouraged: Influenza  The above risks/problems have been discussed with the patient.  Pertinent information has been shared with the patient in the After  "Visit Summary.  An After Visit Summary and PPPS were made available to the patient.    Follow Up:   Next Medicare Wellness visit to be scheduled in 1 year.       Additional E&M Note during same encounter follows:  Patient has multiple medical problems which are significant and separately identifiable that require additional work above and beyond the Medicare Wellness Visit.      Chief Complaint  Medicare Wellness-subsequent and Referral (IBS- urology referral)    Subjective        HPI    IBS symptoms have worsened. Fluctuates between diarrhea and constipation.   If she goes out to eat, has to stay within 30 mins of home, as symptoms start shortly after eating. She will get stomach cramps and diarrhea after eating.          Objective   Vital Signs:  /66   Pulse 66   Temp 96.9 °F (36.1 °C)   Resp 16   Ht 174.6 cm (68.75\")   Wt 71 kg (156 lb 9.6 oz)   SpO2 96%   BMI 23.29 kg/m²     Physical Exam  Vitals reviewed.   Constitutional:       Appearance: Normal appearance.   HENT:      Right Ear: Tympanic membrane, ear canal and external ear normal.      Left Ear: Tympanic membrane, ear canal and external ear normal.   Cardiovascular:      Rate and Rhythm: Normal rate.      Heart sounds: Normal heart sounds.   Pulmonary:      Effort: Pulmonary effort is normal.      Breath sounds: Normal breath sounds.   Musculoskeletal:         General: No swelling.      Cervical back: Neck supple.   Neurological:      Mental Status: She is alert.   Psychiatric:         Mood and Affect: Mood normal.                       Assessment and Plan   Diagnoses and all orders for this visit:    1. Medicare annual wellness visit, subsequent (Primary)  -     Mammo Screening Digital Tomosynthesis Bilateral With CAD; Future  -     CBC & Differential; Future  -     Comprehensive Metabolic Panel; Future  -     Hemoglobin A1c; Future  -     Lipid Panel With / Chol / HDL Ratio; Future  -     Vitamin D,25-Hydroxy; Future    2. Irritable bowel " syndrome with both constipation and diarrhea  Comments:  Symptoms present for years.  Will refer to gastroenterology for further evaluation  Orders:  -     Ambulatory Referral to Gastroenterology  -     CBC & Differential; Future  -     Comprehensive Metabolic Panel; Future  -     Hemoglobin A1c; Future  -     Lipid Panel With / Chol / HDL Ratio; Future  -     Vitamin D,25-Hydroxy; Future    3. Postmenopausal  -     DEXA Bone Density Axial; Future  -     CBC & Differential; Future  -     Comprehensive Metabolic Panel; Future  -     Hemoglobin A1c; Future  -     Lipid Panel With / Chol / HDL Ratio; Future  -     Vitamin D,25-Hydroxy; Future    4. Age-related osteoporosis without current pathological fracture  Comments:  She reports that she is not taking daily vitamin D or calcium.  Orders:  -     CBC & Differential; Future  -     Comprehensive Metabolic Panel; Future  -     Hemoglobin A1c; Future  -     Lipid Panel With / Chol / HDL Ratio; Future  -     Vitamin D,25-Hydroxy; Future    5. Other hyperlipidemia  -     CBC & Differential; Future  -     Comprehensive Metabolic Panel; Future  -     Hemoglobin A1c; Future  -     Lipid Panel With / Chol / HDL Ratio; Future  -     Vitamin D,25-Hydroxy; Future  -     atorvastatin (LIPITOR) 10 MG tablet; Take 1 tablet by mouth Every Night.  Dispense: 90 tablet; Refill: 0    6. Prediabetes  -     CBC & Differential; Future  -     Comprehensive Metabolic Panel; Future  -     Hemoglobin A1c; Future  -     Lipid Panel With / Chol / HDL Ratio; Future  -     Vitamin D,25-Hydroxy; Future    7. Flu vaccine need  -     Fluzone High-Dose 65+yrs    8. Encounter for screening mammogram for malignant neoplasm of breast  -     Mammo Screening Digital Tomosynthesis Bilateral With CAD; Future  -     CBC & Differential; Future  -     Comprehensive Metabolic Panel; Future  -     Hemoglobin A1c; Future  -     Lipid Panel With / Chol / HDL Ratio; Future  -     Vitamin D,25-Hydroxy; Future              Follow Up   Return in about 1 year (around 9/21/2024) for Medicare Wellness.  Patient was given instructions and counseling regarding her condition or for health maintenance advice. Please see specific information pulled into the AVS if appropriate.

## 2023-09-22 ENCOUNTER — LAB (OUTPATIENT)
Dept: FAMILY MEDICINE CLINIC | Facility: CLINIC | Age: 73
End: 2023-09-22
Payer: MEDICARE

## 2023-09-22 DIAGNOSIS — Z78.0 POSTMENOPAUSAL: ICD-10-CM

## 2023-09-22 DIAGNOSIS — R73.03 PREDIABETES: ICD-10-CM

## 2023-09-22 DIAGNOSIS — Z12.31 ENCOUNTER FOR SCREENING MAMMOGRAM FOR MALIGNANT NEOPLASM OF BREAST: ICD-10-CM

## 2023-09-22 DIAGNOSIS — Z00.00 MEDICARE ANNUAL WELLNESS VISIT, SUBSEQUENT: ICD-10-CM

## 2023-09-22 DIAGNOSIS — K58.2 IRRITABLE BOWEL SYNDROME WITH BOTH CONSTIPATION AND DIARRHEA: ICD-10-CM

## 2023-09-22 DIAGNOSIS — M81.0 AGE-RELATED OSTEOPOROSIS WITHOUT CURRENT PATHOLOGICAL FRACTURE: ICD-10-CM

## 2023-09-22 DIAGNOSIS — E78.49 OTHER HYPERLIPIDEMIA: ICD-10-CM

## 2023-09-23 LAB
25(OH)D3+25(OH)D2 SERPL-MCNC: 24.3 NG/ML (ref 30–100)
ALBUMIN SERPL-MCNC: 4.7 G/DL (ref 3.5–5.2)
ALBUMIN/GLOB SERPL: 2 G/DL
ALP SERPL-CCNC: 64 U/L (ref 39–117)
ALT SERPL-CCNC: 10 U/L (ref 1–33)
AST SERPL-CCNC: 17 U/L (ref 1–32)
BASOPHILS # BLD AUTO: 0.03 10*3/MM3 (ref 0–0.2)
BASOPHILS NFR BLD AUTO: 0.6 % (ref 0–1.5)
BILIRUB SERPL-MCNC: 0.8 MG/DL (ref 0–1.2)
BUN SERPL-MCNC: 15 MG/DL (ref 8–23)
BUN/CREAT SERPL: 14.6 (ref 7–25)
CALCIUM SERPL-MCNC: 10 MG/DL (ref 8.6–10.5)
CHLORIDE SERPL-SCNC: 105 MMOL/L (ref 98–107)
CHOLEST SERPL-MCNC: 161 MG/DL (ref 0–200)
CHOLEST/HDLC SERPL: 2.48 {RATIO}
CO2 SERPL-SCNC: 26 MMOL/L (ref 22–29)
CREAT SERPL-MCNC: 1.03 MG/DL (ref 0.57–1)
EGFRCR SERPLBLD CKD-EPI 2021: 58.3 ML/MIN/1.73
EOSINOPHIL # BLD AUTO: 0.09 10*3/MM3 (ref 0–0.4)
EOSINOPHIL NFR BLD AUTO: 1.9 % (ref 0.3–6.2)
ERYTHROCYTE [DISTWIDTH] IN BLOOD BY AUTOMATED COUNT: 12.5 % (ref 12.3–15.4)
GLOBULIN SER CALC-MCNC: 2.3 GM/DL
GLUCOSE SERPL-MCNC: 108 MG/DL (ref 65–99)
HBA1C MFR BLD: 5.9 % (ref 4.8–5.6)
HCT VFR BLD AUTO: 44 % (ref 34–46.6)
HDLC SERPL-MCNC: 65 MG/DL (ref 40–60)
HGB BLD-MCNC: 14.5 G/DL (ref 12–15.9)
IMM GRANULOCYTES # BLD AUTO: 0.02 10*3/MM3 (ref 0–0.05)
IMM GRANULOCYTES NFR BLD AUTO: 0.4 % (ref 0–0.5)
LDLC SERPL CALC-MCNC: 77 MG/DL (ref 0–100)
LYMPHOCYTES # BLD AUTO: 0.94 10*3/MM3 (ref 0.7–3.1)
LYMPHOCYTES NFR BLD AUTO: 20.3 % (ref 19.6–45.3)
MCH RBC QN AUTO: 31 PG (ref 26.6–33)
MCHC RBC AUTO-ENTMCNC: 33 G/DL (ref 31.5–35.7)
MCV RBC AUTO: 94.2 FL (ref 79–97)
MONOCYTES # BLD AUTO: 0.55 10*3/MM3 (ref 0.1–0.9)
MONOCYTES NFR BLD AUTO: 11.9 % (ref 5–12)
NEUTROPHILS # BLD AUTO: 3.01 10*3/MM3 (ref 1.7–7)
NEUTROPHILS NFR BLD AUTO: 64.9 % (ref 42.7–76)
NRBC BLD AUTO-RTO: 0 /100 WBC (ref 0–0.2)
PLATELET # BLD AUTO: 269 10*3/MM3 (ref 140–450)
POTASSIUM SERPL-SCNC: 5 MMOL/L (ref 3.5–5.2)
PROT SERPL-MCNC: 7 G/DL (ref 6–8.5)
RBC # BLD AUTO: 4.67 10*6/MM3 (ref 3.77–5.28)
SODIUM SERPL-SCNC: 142 MMOL/L (ref 136–145)
TRIGL SERPL-MCNC: 106 MG/DL (ref 0–150)
VLDLC SERPL CALC-MCNC: 19 MG/DL (ref 5–40)
WBC # BLD AUTO: 4.64 10*3/MM3 (ref 3.4–10.8)

## 2023-09-25 ENCOUNTER — TELEPHONE (OUTPATIENT)
Dept: FAMILY MEDICINE CLINIC | Facility: CLINIC | Age: 73
End: 2023-09-25
Payer: MEDICARE

## 2023-09-25 DIAGNOSIS — R79.89 ELEVATED SERUM CREATININE: Primary | ICD-10-CM

## 2023-09-25 NOTE — TELEPHONE ENCOUNTER
Pt called stating [facility] and would like a call back for discussions.      [phone]

## 2023-10-13 ENCOUNTER — OFFICE VISIT (OUTPATIENT)
Dept: GASTROENTEROLOGY | Facility: CLINIC | Age: 73
End: 2023-10-13
Payer: MEDICARE

## 2023-10-13 VITALS
BODY MASS INDEX: 35.87 KG/M2 | TEMPERATURE: 97.3 F | SYSTOLIC BLOOD PRESSURE: 121 MMHG | HEIGHT: 55 IN | HEART RATE: 66 BPM | WEIGHT: 155 LBS | DIASTOLIC BLOOD PRESSURE: 93 MMHG

## 2023-10-13 DIAGNOSIS — K58.8 OTHER IRRITABLE BOWEL SYNDROME: Primary | ICD-10-CM

## 2023-10-13 NOTE — PROGRESS NOTES
"GASTROENTEROLOGY OFFICE NOTE  Penny Delvalle  [id number]  1950    CARE TEAM  Patient Care Team:  Genie Tian DO as PCP - General (Family Medicine)    Referring Provider: Genie Tian DO    Chief Complaint   Patient presents with    Constipation        HISTORY OF PRESENT ILLNESS:  Patient is a 73-year-old female who presents today with complaints of alternating bowel habits.  She reports she has had problems with this dating all the way back to college.  Overwhelmingly, her complaint is having \"attacks\" of diarrhea that can last days or few weeks at a time.  When one of these attacks occur she is having urgent, watery diarrhea typically postprandially within 20 minutes of eating.  She has significant abdominal cramping associated with diarrhea.  Otherwise, in between these attacks she has constipation.  She has gone up to 2 weeks without having a significant bowel movement but typically only has a bowel movement every 2 to 3 days and is very small and pebble-like.    She had a colonoscopy in 2017.  A colon polyp was removed.  Apparently, she had a microperforation from the polypectomy site and she is hesitant to repeat a colonoscopy due to this.    PAST MEDICAL HISTORY  Past Medical History:   Diagnosis Date    Arthritis     rt shoulder     History of kidney stones     x1 passed    History of transfusion     3 units with ectopic pregnancy    Hyperlipidemia     IBS (irritable bowel syndrome)     Ovarian cyst 07/2017    found on CT after colonoscopy complication     Perforation of colon as colonoscopy complication 07/2017    PONV (postoperative nausea and vomiting)     after colonoscopy in 2017 only     Toenail fungus         PAST SURGICAL HISTORY  Past Surgical History:   Procedure Laterality Date    BREAST BIOPSY      BREAST CYST ASPIRATION Left     COLONOSCOPY  07/24/2017    perforated colon afterwards     ECTOPIC PREGNANCY  1978    JOINT REPLACEMENT  2015    RT Hip    OOPHORECTOMY      OVARIAN " "CYST DRAINAGE/EXCISION N/A 2018    Procedure: LAPAROSCOPIC REMOVAL OF BILATERAL TUBES AND OVARIES  ;  Surgeon: Giovanny Pruitt MD;  Location: [facility];  Service:     SKIN CANCER DESTRUCTION      neck    SKIN CANCER EXCISION      face    TOTAL HIP ARTHROPLASTY Right         MEDICATIONS:    Current Outpatient Medications:     acetaminophen (TYLENOL) 325 MG tablet, Take 2 tablets by mouth Every 6 (Six) Hours As Needed for Mild Pain., Disp: , Rfl:     atorvastatin (LIPITOR) 10 MG tablet, Take 1 tablet by mouth Every Night., Disp: 90 tablet, Rfl: 0    ALLERGIES  No Known Allergies    FAMILY HISTORY:  Family History   Problem Relation Age of Onset    Early death Mother         pheochromocytoma    Hearing loss Father     Hyperlipidemia Father     Vision loss Father         macular degeneration    Arthritis Paternal Grandmother     Alcohol abuse Brother     Breast cancer Neg Hx     Ovarian cancer Neg Hx        SOCIAL HISTORY  Social History     Socioeconomic History    Marital status:    Tobacco Use    Smoking status: Former     Packs/day: 0.10     Years: 4.00     Additional pack years: 0.00     Total pack years: 0.40     Types: Cigarettes     Quit date: 1974     Years since quittin.8    Smokeless tobacco: Never   Vaping Use    Vaping Use: Never used   Substance and Sexual Activity    Alcohol use: Yes     Alcohol/week: 14.0 standard drinks of alcohol     Types: 14 Glasses of wine per week     Comment: 2-3 glasses of wine or bourbon daily     Drug use: No    Sexual activity: Yes     Partners: Male         PHYSICAL EXAM   /93 (BP Location: Left arm, Patient Position: Sitting, Cuff Size: Adult)   Pulse 66   Temp 97.3 øF (36.3 øC) (Temporal)   Ht 68.8 cm (27.07\")   Wt 70.3 kg (155 lb)   .75 kg/mý   Physical Exam  Constitutional:       Appearance: Normal appearance.   HENT:      Head: Normocephalic and atraumatic.   Pulmonary:      Effort: Pulmonary effort is normal.   Neurological: "      Mental Status: She is alert and oriented to person, place, and time.   Psychiatric:         Mood and Affect: Mood normal.         Thought Content: Thought content normal.           ASSESSMENT / PLAN  1.  Constipation/diarrhea-suspect constipation with diarrhea overflow  - Begin Metamucil fiber supplement daily  - MiraLAX 17 g daily    Return in about 2 months (around 12/13/2023).    I discussed the patients findings and my recommendations with patient    Bright Hernandes, APRN

## 2023-11-02 DIAGNOSIS — R20.2 LEFT HAND PARESTHESIA: ICD-10-CM

## 2023-11-02 RX ORDER — PREDNISONE 10 MG/1
TABLET ORAL
Qty: 21 TABLET | Refills: 0 | OUTPATIENT
Start: 2023-11-02

## 2023-11-10 ENCOUNTER — HOSPITAL ENCOUNTER (OUTPATIENT)
Dept: MAMMOGRAPHY | Facility: HOSPITAL | Age: 73
Discharge: HOME | End: 2023-11-10
Payer: MEDICARE

## 2023-11-10 ENCOUNTER — HOSPITAL ENCOUNTER (OUTPATIENT)
Dept: BONE DENSITY | Facility: HOSPITAL | Age: 73
Discharge: HOME | End: 2023-11-10
Admitting: FAMILY MEDICINE
Payer: MEDICARE

## 2023-11-10 DIAGNOSIS — Z12.31 ENCOUNTER FOR SCREENING MAMMOGRAM FOR MALIGNANT NEOPLASM OF BREAST: ICD-10-CM

## 2023-11-10 DIAGNOSIS — Z00.00 MEDICARE ANNUAL WELLNESS VISIT, SUBSEQUENT: ICD-10-CM

## 2023-11-10 DIAGNOSIS — Z78.0 POSTMENOPAUSAL: ICD-10-CM

## 2023-11-10 PROCEDURE — 77080 DXA BONE DENSITY AXIAL: CPT

## 2023-11-10 PROCEDURE — 77067 SCR MAMMO BI INCL CAD: CPT

## 2023-11-10 PROCEDURE — 77063 BREAST TOMOSYNTHESIS BI: CPT

## 2023-11-21 ENCOUNTER — LAB (OUTPATIENT)
Dept: FAMILY MEDICINE CLINIC | Facility: CLINIC | Age: 73
End: 2023-11-21
Payer: MEDICARE

## 2023-11-21 DIAGNOSIS — R79.89 ELEVATED SERUM CREATININE: ICD-10-CM

## 2023-11-21 LAB
BUN SERPL-MCNC: 16 MG/DL (ref 8–23)
BUN/CREAT SERPL: 18.2 (ref 7–25)
CALCIUM SERPL-MCNC: 9.5 MG/DL (ref 8.6–10.5)
CHLORIDE SERPL-SCNC: 105 MMOL/L (ref 98–107)
CO2 SERPL-SCNC: 27.8 MMOL/L (ref 22–29)
CREAT SERPL-MCNC: 0.88 MG/DL (ref 0.57–1)
EGFRCR SERPLBLD CKD-EPI 2021: 70.4 ML/MIN/1.73
GLUCOSE SERPL-MCNC: 75 MG/DL (ref 65–99)
POTASSIUM SERPL-SCNC: 4.5 MMOL/L (ref 3.5–5.2)
SODIUM SERPL-SCNC: 142 MMOL/L (ref 136–145)

## 2023-12-10 DIAGNOSIS — E78.49 OTHER HYPERLIPIDEMIA: ICD-10-CM

## 2023-12-10 RX ORDER — ATORVASTATIN CALCIUM 10 MG/1
10 TABLET, FILM COATED ORAL NIGHTLY
Qty: 90 TABLET | Refills: 3 | Status: SHIPPED | OUTPATIENT
Start: 2023-12-10

## 2023-12-10 RX ORDER — ALENDRONATE SODIUM 70 MG/1
70 TABLET ORAL
Qty: 4 TABLET | Refills: 12 | Status: SHIPPED | OUTPATIENT
Start: 2023-12-10

## 2023-12-20 DIAGNOSIS — E78.49 OTHER HYPERLIPIDEMIA: ICD-10-CM

## 2023-12-20 RX ORDER — ATORVASTATIN CALCIUM 10 MG/1
10 TABLET, FILM COATED ORAL
Qty: 90 TABLET | Refills: 3 | OUTPATIENT
Start: 2023-12-20

## 2024-01-08 DIAGNOSIS — E78.49 OTHER HYPERLIPIDEMIA: ICD-10-CM

## 2024-01-08 NOTE — TELEPHONE ENCOUNTER
"    Caller: Penny Delvalle \"Nereida\"    Relationship: Self    Best call back number: [phone]    Which medication are you concerned about: ATORVASTATIN 10 MG    Who prescribed you this medication: [facility]    When did you start taking this medication: N/A    What are your concerns: PATIENT STATED THAT MEDICATION WAS SENT TO [facility] ON 12/10/23; ALL PRESCRIPTIONS SHOULD BE GOING TO [facility]; PATIENT HAS A FEW DAYS LEFT OF MEDICATION LEFT PLEASE SEND TODAY THANKS    How long have you had these concerns: N/A    "

## 2024-01-09 RX ORDER — ATORVASTATIN CALCIUM 10 MG/1
10 TABLET, FILM COATED ORAL
Qty: 90 TABLET | Refills: 3 | Status: SHIPPED | OUTPATIENT
Start: 2024-01-09

## 2024-02-20 ENCOUNTER — OFFICE VISIT (OUTPATIENT)
Dept: GASTROENTEROLOGY | Facility: CLINIC | Age: 74
End: 2024-02-20
Payer: MEDICARE

## 2024-02-20 VITALS
WEIGHT: 161.4 LBS | TEMPERATURE: 97.3 F | HEIGHT: 69 IN | HEART RATE: 70 BPM | BODY MASS INDEX: 23.91 KG/M2 | DIASTOLIC BLOOD PRESSURE: 84 MMHG | SYSTOLIC BLOOD PRESSURE: 121 MMHG

## 2024-02-20 DIAGNOSIS — K59.09 CHRONIC CONSTIPATION WITH OVERFLOW: Primary | ICD-10-CM

## 2024-02-20 PROCEDURE — 1159F MED LIST DOCD IN RCRD: CPT | Performed by: NURSE PRACTITIONER

## 2024-02-20 PROCEDURE — 99213 OFFICE O/P EST LOW 20 MIN: CPT | Performed by: NURSE PRACTITIONER

## 2024-02-20 PROCEDURE — 1160F RVW MEDS BY RX/DR IN RCRD: CPT | Performed by: NURSE PRACTITIONER

## 2024-02-20 NOTE — PROGRESS NOTES
GASTROENTEROLOGY OFFICE NOTE  Penny Delvalle  [id number]  1950    CARE TEAM  Patient Care Team:  Genie Tian DO as PCP - General (Family Medicine)    Referring Provider: Genie Tian DO     Chief Complaint   Patient presents with    Follow-up        HISTORY OF PRESENT ILLNESS:  Patient returns in follow-up for chronic constipation with overflow diarrhea.  She has been taking Metamucil and MiraLAX.  She started by taking only Metamucil daily after about 2 weeks she began having diarrhea, sometimes without urge.  She has cut back on taking Metamucil and is now taking MiraLAX but waiting until it has been about 3 days before she has a bowel movement before she starts taking MiraLAX and then it takes her another 3 to 4 days to have a bowel movement and it is typically diarrheal and she therefore stopped MiraLAX again.  She has some troubles taking the Metamucil, she does not  mind the taste but it makes her feel overly full and then she does not feel like eating anything.    Overall, she is having a hard time finding balance in what she should be taking.    PAST MEDICAL HISTORY  Past Medical History:   Diagnosis Date    Arthritis     rt shoulder     History of kidney stones     x1 passed    History of transfusion     3 units with ectopic pregnancy    Hyperlipidemia     IBS (irritable bowel syndrome)     Ovarian cyst 07/2017    found on CT after colonoscopy complication     Perforation of colon as colonoscopy complication 07/2017    PONV (postoperative nausea and vomiting)     after colonoscopy in 2017 only     Toenail fungus         PAST SURGICAL HISTORY  Past Surgical History:   Procedure Laterality Date    BREAST BIOPSY      BREAST CYST ASPIRATION Left     COLONOSCOPY  07/24/2017    perforated colon afterwards     ECTOPIC PREGNANCY  1978    JOINT REPLACEMENT  2015    RT Hip    OOPHORECTOMY      OVARIAN CYST DRAINAGE/EXCISION N/A 03/08/2018    Procedure: LAPAROSCOPIC REMOVAL OF BILATERAL TUBES AND  "OVARIES  ;  Surgeon: Giovanny Pruitt MD;  Location: [facility];  Service:     SKIN CANCER DESTRUCTION      neck    SKIN CANCER EXCISION      face    TOTAL HIP ARTHROPLASTY Right         MEDICATIONS:    Current Outpatient Medications:     acetaminophen (TYLENOL) 325 MG tablet, Take 2 tablets by mouth Every 6 (Six) Hours As Needed for Mild Pain., Disp: , Rfl:     atorvastatin (LIPITOR) 10 MG tablet, TAKE 1 TABLET BY MOUTH EVERY DAY AT NIGHT, Disp: 90 tablet, Rfl: 3    alendronate (Fosamax) 70 MG tablet, Take 1 tablet by mouth Every 7 (Seven) Days. (Patient not taking: Reported on 2024), Disp: 4 tablet, Rfl: 12    ALLERGIES  No Known Allergies    FAMILY HISTORY:  Family History   Problem Relation Age of Onset    Early death Mother         pheochromocytoma    Hearing loss Father     Hyperlipidemia Father     Vision loss Father         macular degeneration    Arthritis Paternal Grandmother     Alcohol abuse Brother     Breast cancer Neg Hx     Ovarian cancer Neg Hx        SOCIAL HISTORY  Social History     Socioeconomic History    Marital status:    Tobacco Use    Smoking status: Former     Packs/day: 0.10     Years: 4.00     Additional pack years: 0.00     Total pack years: 0.40     Types: Cigarettes     Quit date: 1974     Years since quittin.1    Smokeless tobacco: Never   Vaping Use    Vaping Use: Never used   Substance and Sexual Activity    Alcohol use: Yes     Alcohol/week: 14.0 standard drinks of alcohol     Types: 14 Glasses of wine per week     Comment: 2-3 glasses of wine or bourbon daily     Drug use: No    Sexual activity: Yes     Partners: Male         PHYSICAL EXAM   /84 (BP Location: Right arm, Patient Position: Sitting, Cuff Size: Adult)   Pulse 70   Temp 97.3 °F (36.3 °C) (Temporal)   Ht 175.3 cm (69\")   Wt 73.2 kg (161 lb 6.4 oz)   BMI 23.83 kg/m²   Physical Exam  Constitutional:       Appearance: Normal appearance.   HENT:      Head: Normocephalic and atraumatic. "   Pulmonary:      Effort: Pulmonary effort is normal.   Neurological:      Mental Status: She is alert and oriented to person, place, and time.   Psychiatric:         Mood and Affect: Mood normal.         Thought Content: Thought content normal.           ASSESSMENT / PLAN  Diagnoses and all orders for this visit:    1. Chronic constipation with overflow (Primary)         >> I have suggested that she increase dietary fiber and discontinue Metamucil.  MiraLAX seems to work okay for her although she is not taking it consistently.  When she does take it eventually, she ends up with diarrhea.  I would hesitate to begin her on anything more aggressive than MiraLAX.  She is going to start taking MiraLAX 17 g daily and if needed will decrease this to every other day.      Return in about 3 months (around 5/20/2024).    I discussed the patients findings and my recommendations with patient    KRISTIN Lynn

## 2024-05-08 NOTE — TELEPHONE ENCOUNTER
Problem: Pain  Goal: Acceptable pain level achieved/maintained at rest using appropriate pain scale for the patient  Outcome: Monitoring/Evaluating progress  Goal: Acceptable pain level achieved/maintained with activity using appropriate pain scale for the patient  Outcome: Monitoring/Evaluating progress  Goal: Acceptable pain level achieved/maintained without oversedation  Outcome: Monitoring/Evaluating progress     Problem: Anxiety  Goal: Anxiety is at a manageable level with interventions  Outcome: Monitoring/Evaluating progress  Goal: Anxiety is decreased  Outcome: Monitoring/Evaluating progress  Goal: Verbalizes understanding of anxiety symptoms and management  Description: Document on Patient Education Activity   Outcome: Monitoring/Evaluating progress     Problem: GI Bleed  Goal: S/S of GI bleeding reduced/controlled  Outcome: Monitoring/Evaluating progress  Goal: Verbalizes understanding of s/s of GI bleeding and treatment  Description: Document education using the patient education activity.   Outcome: Monitoring/Evaluating progress      PT CALLING BACK FOR LAB RESULT AND HAD QUESTIONS ABOUT STOPPING THE ANTIBIOTIC    [phone]

## 2024-09-25 ENCOUNTER — OFFICE VISIT (OUTPATIENT)
Dept: FAMILY MEDICINE CLINIC | Facility: CLINIC | Age: 74
End: 2024-09-25
Payer: MEDICARE

## 2024-09-25 VITALS
BODY MASS INDEX: 23.99 KG/M2 | WEIGHT: 162 LBS | DIASTOLIC BLOOD PRESSURE: 66 MMHG | OXYGEN SATURATION: 95 % | HEIGHT: 69 IN | HEART RATE: 78 BPM | SYSTOLIC BLOOD PRESSURE: 102 MMHG | RESPIRATION RATE: 16 BRPM | TEMPERATURE: 97.5 F

## 2024-09-25 DIAGNOSIS — E55.9 VITAMIN D DEFICIENCY: ICD-10-CM

## 2024-09-25 DIAGNOSIS — Z00.00 MEDICARE ANNUAL WELLNESS VISIT, SUBSEQUENT: Primary | ICD-10-CM

## 2024-09-25 DIAGNOSIS — R53.83 OTHER FATIGUE: ICD-10-CM

## 2024-09-25 DIAGNOSIS — Z12.31 ENCOUNTER FOR SCREENING MAMMOGRAM FOR MALIGNANT NEOPLASM OF BREAST: ICD-10-CM

## 2024-09-25 DIAGNOSIS — M81.0 AGE-RELATED OSTEOPOROSIS WITHOUT CURRENT PATHOLOGICAL FRACTURE: ICD-10-CM

## 2024-09-25 DIAGNOSIS — F51.01 PRIMARY INSOMNIA: ICD-10-CM

## 2024-09-25 DIAGNOSIS — E78.49 OTHER HYPERLIPIDEMIA: ICD-10-CM

## 2024-09-25 DIAGNOSIS — R73.03 PREDIABETES: ICD-10-CM

## 2024-09-25 DIAGNOSIS — K59.09 CHRONIC CONSTIPATION WITH OVERFLOW: ICD-10-CM

## 2024-09-25 PROCEDURE — 1170F FXNL STATUS ASSESSED: CPT | Performed by: FAMILY MEDICINE

## 2024-09-25 PROCEDURE — 1160F RVW MEDS BY RX/DR IN RCRD: CPT | Performed by: FAMILY MEDICINE

## 2024-09-25 PROCEDURE — 1126F AMNT PAIN NOTED NONE PRSNT: CPT | Performed by: FAMILY MEDICINE

## 2024-09-25 PROCEDURE — G0439 PPPS, SUBSEQ VISIT: HCPCS | Performed by: FAMILY MEDICINE

## 2024-09-25 PROCEDURE — 1159F MED LIST DOCD IN RCRD: CPT | Performed by: FAMILY MEDICINE

## 2024-09-25 PROCEDURE — 99214 OFFICE O/P EST MOD 30 MIN: CPT | Performed by: FAMILY MEDICINE

## 2024-09-25 PROCEDURE — 3288F FALL RISK ASSESSMENT DOCD: CPT | Performed by: FAMILY MEDICINE

## 2024-09-25 RX ORDER — ATORVASTATIN CALCIUM 10 MG/1
10 TABLET, FILM COATED ORAL
Qty: 90 TABLET | Refills: 3 | Status: SHIPPED | OUTPATIENT
Start: 2024-09-25

## 2024-09-25 RX ORDER — TRAZODONE HYDROCHLORIDE 100 MG/1
50-100 TABLET ORAL NIGHTLY PRN
Qty: 90 TABLET | Refills: 1 | Status: SHIPPED | OUTPATIENT
Start: 2024-09-25

## 2024-10-14 ENCOUNTER — TELEPHONE (OUTPATIENT)
Dept: FAMILY MEDICINE CLINIC | Facility: CLINIC | Age: 74
End: 2024-10-14
Payer: MEDICARE

## 2024-10-14 ENCOUNTER — LAB (OUTPATIENT)
Dept: FAMILY MEDICINE CLINIC | Facility: CLINIC | Age: 74
End: 2024-10-14
Payer: MEDICARE

## 2024-10-14 DIAGNOSIS — E78.49 HYPERLIPIDEMIA DUE TO DIETARY FAT INTAKE: ICD-10-CM

## 2024-10-14 DIAGNOSIS — R73.03 PREDIABETES: ICD-10-CM

## 2024-10-14 DIAGNOSIS — E55.9 VITAMIN D DEFICIENCY: ICD-10-CM

## 2024-10-14 DIAGNOSIS — M81.0 AGE-RELATED OSTEOPOROSIS WITHOUT CURRENT PATHOLOGICAL FRACTURE: ICD-10-CM

## 2024-10-14 DIAGNOSIS — R53.83 FATIGUE, UNSPECIFIED TYPE: ICD-10-CM

## 2024-10-14 DIAGNOSIS — K59.09 OTHER CONSTIPATION: ICD-10-CM

## 2024-10-14 DIAGNOSIS — Z00.00 MEDICARE ANNUAL WELLNESS VISIT, SUBSEQUENT: Primary | ICD-10-CM

## 2024-10-14 NOTE — TELEPHONE ENCOUNTER
In an attempt to delete duplicate lab orders from 9/25 and 9/27, both orders were deleted. Re-entering them now.

## 2024-10-15 ENCOUNTER — TELEPHONE (OUTPATIENT)
Dept: FAMILY MEDICINE CLINIC | Facility: CLINIC | Age: 74
End: 2024-10-15
Payer: MEDICARE

## 2024-10-15 ENCOUNTER — OFFICE VISIT (OUTPATIENT)
Dept: GASTROENTEROLOGY | Facility: CLINIC | Age: 74
End: 2024-10-15
Payer: MEDICARE

## 2024-10-15 VITALS
DIASTOLIC BLOOD PRESSURE: 60 MMHG | SYSTOLIC BLOOD PRESSURE: 110 MMHG | HEIGHT: 69 IN | BODY MASS INDEX: 23.25 KG/M2 | WEIGHT: 157 LBS

## 2024-10-15 DIAGNOSIS — K59.04 CHRONIC IDIOPATHIC CONSTIPATION: Primary | ICD-10-CM

## 2024-10-15 DIAGNOSIS — Z86.0100 HISTORY OF COLONIC POLYPS: ICD-10-CM

## 2024-10-15 PROBLEM — R31.9 BLOOD IN URINE: Status: ACTIVE | Noted: 2019-09-24

## 2024-10-15 RX ORDER — POLYETHYLENE GLYCOL 3350 17 G/17G
17 POWDER, FOR SOLUTION ORAL DAILY
Qty: 510 G | Refills: 11 | Status: SHIPPED | OUTPATIENT
Start: 2024-10-15

## 2024-10-15 NOTE — PROGRESS NOTES
GASTROENTEROLOGY OFFICE NOTE    Penny Delvalle  [id number]  1950    CARE TEAM  Patient Care Team:  Genie Tian DO as PCP - General (Family Medicine)    Referring Provider: No ref. provider found    Chief Complaint   Patient presents with    Constipation     8 mth follow up        HISTORY OF PRESENT ILLNESS:   Penny Delvalle is a 74 y.o. female who returns for follow up regarding constipation.     Most recent office visit 2/20/2024 per KRISTIN Haney that revealed patient return for follow-up regarding chronic constipation with overflow diarrhea taking Metamucil and MiraLAX possibly as needed.  It was documented When taking MiraLAX, it takes 3 to 4 days to have a bowel movement with bowel movement described as diarrhea and therefore discontinued MiraLAX due to diarrhea.  It was documented Metamucil caused patient to feel full with decreased appetite.  At that visit, it was recommended patient increase intake of dietary fiber, stop Metamucil.  MiraLAX 17 g daily recommended and if needed decrease to every other day.    She was given 8 doses of Linzess 145 mcg to take daily per her primary care provider.  She took Linzess for 8 days, felt as though Linzess was helpful for 6 days but not helpful the last 2 days she was taking Linzess.  She also checked with her pharmacy regarding out-of-pocket cost of Linzess and was told Linzess was $580 per month.    After taking Linzess she returned to taking MiraLAX.  For the past 4 weeks she has been taking 1 dose of MiraLAX daily and has a soft or loose bowel movements most days.    She has made dietary changes possibly for weight loss.  And reports decreased intake for approximately 4 weeks possibly contributing to change in bowel movements.     She takes Metamucil at times.    She has history of skipping up to 7 days without a bowel movement followed by diarrhea for several days.    Prior use of MiraLAX with Metamucil caused diarrhea with fecal  incontinence.    MiraLAX as needed did not seem to control constipation.    Review of limited information in care everywhere reveals she had colonoscopy 7/27/2017 at [facility].  Pathology report revealed tubular adenoma.  She had CT scan following colonoscopy due to vomiting (there is documented reason for CT scan to be abdominal pain after colonoscopy and Hemoclip in rectosigmoid).  Overall impression revealed surgical clip along rectosigmoid junction with associated focal areas of pneumatosis, intermediate attenuation fluid collection in left pelvis with high attenuation material located along posterior lateral aspect of collection thought to possibly represent fluid or hemorrhage associated with left ovary, recommend follow-up evaluation to evaluate for resolution.     She is hesitant to schedule repeat colonoscopy due to her prior experience at time of colonoscopy      Past Medical History:   Diagnosis Date    Arthritis     rt shoulder     History of kidney stones     x1 passed    History of transfusion     3 units with ectopic pregnancy    HL (hearing loss)     Hyperlipidemia     IBS (irritable bowel syndrome)     Ovarian cyst 07/2017    found on CT after colonoscopy complication     Perforation of colon as colonoscopy complication 07/2017    PONV (postoperative nausea and vomiting)     after colonoscopy in 2017 only     Toenail fungus         Past Surgical History:   Procedure Laterality Date    BREAST BIOPSY      BREAST CYST ASPIRATION Left     COLONOSCOPY  07/24/2017    perforated colon afterwards     ECTOPIC PREGNANCY  1978    JOINT REPLACEMENT  2015    RT Hip    OOPHORECTOMY      OVARIAN CYST DRAINAGE/EXCISION N/A 03/08/2018    Procedure: LAPAROSCOPIC REMOVAL OF BILATERAL TUBES AND OVARIES  ;  Surgeon: Giovanny Pruitt MD;  Location: [facility];  Service:     SKIN CANCER DESTRUCTION      neck    SKIN CANCER EXCISION      face    TOTAL HIP ARTHROPLASTY Right         Current Outpatient  "Medications on File Prior to Visit   Medication Sig    acetaminophen (TYLENOL) 325 MG tablet Take 2 tablets by mouth Every 6 (Six) Hours As Needed for Mild Pain.    atorvastatin (LIPITOR) 10 MG tablet Take 1 tablet by mouth every night at bedtime.    linaclotide (Linzess) 145 MCG capsule capsule Take 1 capsule by mouth Every Morning Before Breakfast.    traZODone (DESYREL) 100 MG tablet Take 0.5-1 tablets by mouth At Night As Needed for Sleep.     No current facility-administered medications on file prior to visit.       No Known Allergies    Family History   Problem Relation Age of Onset    Early death Mother         pheochromocytoma    Hearing loss Father     Hyperlipidemia Father     Vision loss Father         macular degeneration    Arthritis Paternal Grandmother     Alcohol abuse Brother     Breast cancer Neg Hx     Ovarian cancer Neg Hx        Social History     Socioeconomic History    Marital status:    Tobacco Use    Smoking status: Former     Current packs/day: 0.00     Average packs/day: 0.1 packs/day for 4.0 years (0.4 ttl pk-yrs)     Types: Cigarettes     Start date: 1970     Quit date: 1974     Years since quittin.8    Smokeless tobacco: Never   Vaping Use    Vaping status: Never Used   Substance and Sexual Activity    Alcohol use: Yes     Alcohol/week: 14.0 standard drinks of alcohol     Types: 14 Glasses of wine per week     Comment: 2-3 glasses of wine or bourbon daily     Drug use: No    Sexual activity: Yes     Partners: Male       PHYSICAL EXAM   /60 (BP Location: Left arm, Patient Position: Sitting, Cuff Size: Adult)   Ht 175.3 cm (69\")   Wt 71.2 kg (157 lb)   BMI 23.18 kg/m²   Physical Exam  Constitutional:       General: She is not in acute distress.     Appearance: She is not toxic-appearing.   HENT:      Head: Normocephalic and atraumatic. No contusion.      Right Ear: External ear normal.      Left Ear: External ear normal.   Eyes:      General: Lids are normal. " No scleral icterus.        Right eye: No discharge.         Left eye: No discharge.      Extraocular Movements: Extraocular movements intact.   Neck:      Trachea: Trachea normal.      Comments: No visible mass  No visible adenopathy  Cardiovascular:      Rate and Rhythm: Normal rate.   Pulmonary:      Effort: No respiratory distress.      Comments: Symmetrical expansion    Musculoskeletal:      Comments: Symmetrical movement of upper extremities  Symmetrical movement of lower extremities  No visible deformities   Skin:     General: Skin is warm and dry.      Coloration: Skin is not jaundiced.   Neurological:      General: No focal deficit present.      Mental Status: She is alert and oriented to person, place, and time.   Psychiatric:         Mood and Affect: Mood normal.         Behavior: Behavior normal.         Thought Content: Thought content normal.         Results Review:  colonoscopy 7/27/2017 at [facility].  Pathology report revealed tubular adenoma.  She had CT scan following colonoscopy due to vomiting (there is documented reason for CT scan to be abdominal pain after colonoscopy and Hemoclip in rectosigmoid).  Overall impression revealed surgical clip along rectosigmoid junction with associated focal areas of pneumatosis, intermediate attenuation fluid collection in left pelvis with high attenuation material located along posterior lateral aspect of collection thought to possibly represent fluid or hemorrhage associated with left ovary, recommend follow-up evaluation to evaluate for resolution. She was admitted, able to advance diet and discharged.     CMP          11/21/2023    10:37 10/14/2024    11:30   CMP   Glucose 75  94    BUN 16  17    Creatinine 0.88  0.93    Sodium 142  141    Potassium 4.5  4.6    Chloride 105  105    Calcium 9.5  9.3    Total Protein  6.5    Albumin  4.4    Globulin  2.1    Total Bilirubin  0.7    Alkaline Phosphatase  59    AST (SGOT)  19    ALT (SGPT)  13     BUN/Creatinine Ratio 18.2  18      CBC          10/14/2024    11:30   CBC   WBC 4.9    RBC 4.28    Hemoglobin 13.4    Hematocrit 41.6    MCV 97    MCH 31.3    MCHC 32.2    RDW 13.0    Platelets 282        ASSESSMENT / PLAN  1. Chronic idiopathic constipation  - recommend Miralax daily with dose titration discussed and metamucil or other fiber supplement daily  - polyethylene glycol (MiraLax) 17 GM/SCOOP powder; Take 17 g by mouth Daily. Mix in 8 oz of any liquid once daily  Dispense: 510 g; Refill: 11    2. History of colonic polyps  - she is due for repeat colonoscopy due to history of polyps, we discussed risks and benefits and she is willing to proceed      Return for Follow-up after Colonoscopy.    Brynn Miranda, APRN  10/15/2024    ADDENDUM DOMITILA Miranda APRN 10/15/2024: I was able to access medical record through [facility] [employer].  Colonoscopy for screening per Dr. Camarena revealed 9 mm semipedunculated polyp in rectosigmoid colon that was resected and retrieved, polypectomy site treated with soft coag, epinephrine and Hemoclip for persistent oozing without bleeding at the end of the procedure with documentation that patient had significant nausea and lower abdominal discomfort after procedure.  CT scan revealed hemorrhagic cyst unclear etiology patient with history of ectopic pregnancy and surgery and small air within bowel wall at polypectomy site without gross perforation, colorectal surgery contacted for admission.  Repeat colonoscopy recommended in 5 years.  IV antibiotic including Zosyn, nothing by mouth at present, await pathology report.  Pathology revealed tubular adenoma as reviewed during office visit today.  Patient was admitted 7/27/2017 to 7/29/2017.  Patient was able to advance diet during admission without problems and serial abdominal exams were benign.  It was recommended patient follow-up with Dr. Hernandez in 2 weeks.  Additionally patient is recommended to follow-up with OB/GYN  for evaluation of suspected hemorrhagic cyst.

## 2024-10-15 NOTE — TELEPHONE ENCOUNTER
Spoke to patient about her lab results. She just wanted to let you know that she saw gastro and has a colonoscopy scheduled for the first week of January. Her mammogram is scheduled for 11/11. Just HENRRY.

## 2024-11-12 ENCOUNTER — HOSPITAL ENCOUNTER (OUTPATIENT)
Dept: MAMMOGRAPHY | Facility: HOSPITAL | Age: 74
Discharge: HOME | End: 2024-11-12
Admitting: FAMILY MEDICINE
Payer: MEDICARE

## 2024-11-12 DIAGNOSIS — Z12.31 ENCOUNTER FOR SCREENING MAMMOGRAM FOR MALIGNANT NEOPLASM OF BREAST: ICD-10-CM

## 2024-11-12 LAB
NCCN CRITERIA FLAG: NORMAL
TYRER CUZICK SCORE: 4.1

## 2024-11-12 PROCEDURE — 77067 SCR MAMMO BI INCL CAD: CPT

## 2024-11-12 PROCEDURE — 77063 BREAST TOMOSYNTHESIS BI: CPT

## 2024-11-20 ENCOUNTER — HOSPITAL ENCOUNTER (OUTPATIENT)
Dept: MAMMOGRAPHY | Facility: HOSPITAL | Age: 74
Discharge: HOME | End: 2024-11-20
Admitting: FAMILY MEDICINE
Payer: MEDICARE

## 2024-11-20 DIAGNOSIS — R92.8 ABNORMAL MAMMOGRAM: ICD-10-CM

## 2024-11-20 PROCEDURE — G0279 TOMOSYNTHESIS, MAMMO: HCPCS | Performed by: RADIOLOGY

## 2024-11-20 PROCEDURE — 77065 DX MAMMO INCL CAD UNI: CPT | Performed by: RADIOLOGY

## 2024-11-20 PROCEDURE — 77065 DX MAMMO INCL CAD UNI: CPT

## 2024-11-20 PROCEDURE — G0279 TOMOSYNTHESIS, MAMMO: HCPCS

## 2025-01-20 ENCOUNTER — OFFICE VISIT (OUTPATIENT)
Dept: FAMILY MEDICINE CLINIC | Facility: CLINIC | Age: 75
End: 2025-01-20
Payer: MEDICARE

## 2025-01-20 ENCOUNTER — TELEPHONE (OUTPATIENT)
Dept: FAMILY MEDICINE CLINIC | Facility: CLINIC | Age: 75
End: 2025-01-20

## 2025-01-20 VITALS
RESPIRATION RATE: 18 BRPM | OXYGEN SATURATION: 97 % | DIASTOLIC BLOOD PRESSURE: 72 MMHG | HEART RATE: 68 BPM | TEMPERATURE: 96.9 F | WEIGHT: 156.6 LBS | SYSTOLIC BLOOD PRESSURE: 124 MMHG | BODY MASS INDEX: 23.19 KG/M2 | HEIGHT: 69 IN

## 2025-01-20 DIAGNOSIS — H61.21 RIGHT EAR IMPACTED CERUMEN: Primary | ICD-10-CM

## 2025-01-20 PROCEDURE — 99213 OFFICE O/P EST LOW 20 MIN: CPT | Performed by: FAMILY MEDICINE

## 2025-01-20 PROCEDURE — 69209 REMOVE IMPACTED EAR WAX UNI: CPT | Performed by: FAMILY MEDICINE

## 2025-01-20 PROCEDURE — 1126F AMNT PAIN NOTED NONE PRSNT: CPT | Performed by: FAMILY MEDICINE

## 2025-01-20 NOTE — TELEPHONE ENCOUNTER
"  Caller: Penny Delvalle \"Nereida\"    Relationship to patient: Self    Best call back number: [phone]     Chief complaint: NEEDS EAR WAX REMOVAL TO BE DONE THIS WEEK    Type of visit: IN-OFFICE PROCEDURE/ EAR WAX REMOVAL    Requested date: MUST BE DONE THIS WEEK DUE TO HEARING TEST SCHEDULED 01/27/25    Additional notes: PLEASE CALL TO SCHEDULE FOR THIS WEEK TO REMOVE THE EAR WAX.      "

## 2025-01-20 NOTE — PROGRESS NOTES
Chief Complaint  Cerumen Impaction (Right ear)    Subjective          Penny Delvalle presents to [facility]  History of Present Illness  She went to [facility] for hearing check.  Unable to complete right side due to cerumen impaction. She would like removed today.   She is going back to [facility] next week for assessment.       The following portions of the patient's history were reviewed and updated as appropriate: allergies, current medications, past family history, past medical history, past social history, past surgical history, and problem list.    Objective      Physical Exam  Vitals reviewed.   HENT:      Right Ear: There is impacted cerumen.      Left Ear: Tympanic membrane, ear canal and external ear normal.   Pulmonary:      Effort: Pulmonary effort is normal. No respiratory distress.   Neurological:      Mental Status: She is alert.        Result Review :         Ear Cerumen Removal    Date/Time: 1/20/2025 1:43 PM    Performed by: Elvia Colon MA  Authorized by: Genie Tian DO  Consent: Verbal consent obtained.  Risks and benefits: risks, benefits and alternatives were discussed  Consent given by: patient  Patient identity confirmed: verbally with patient    Anesthesia:  Local Anesthetic: none  Location details: right ear  Patient tolerance: patient tolerated the procedure well with no immediate complications  Procedure type: irrigation   Sedation:  Patient sedated: no              Assessment and Plan    Diagnoses and all orders for this visit:    1. Right ear impacted cerumen (Primary)  -     Ambulatory Referral to ENT (Otolaryngology)  -     Ear Cerumen Removal    Unsuccessful ear lavage today, only partial removal of cerumen.   She is scheduled next week for hearing evaluation, will place referral to ENT for cerumen removal.       Follow Up   No follow-ups on file.  Patient was given instructions and counseling regarding her condition or for health  maintenance advice. Please see specific information pulled into the AVS if appropriate.

## (undated) DEVICE — ANTIBACTERIAL UNDYED BRAIDED (POLYGLACTIN 910), SYNTHETIC ABSORBABLE SURGICAL SUTURE: Brand: COATED VICRYL

## (undated) DEVICE — MARYLAND JAW LAPAROSCOPIC SEALER/DIVIDER COATED: Brand: LIGASURE

## (undated) DEVICE — LEX GYN MINOR LAPAROSCOPY: Brand: MEDLINE INDUSTRIES, INC.

## (undated) DEVICE — Device: Brand: JELCO

## (undated) DEVICE — GLV SURG SENSICARE MICRO PF LF 7.5 STRL

## (undated) DEVICE — MEDI-VAC NON-CONDUCTIVE SUCTION TUBING: Brand: CARDINAL HEALTH

## (undated) DEVICE — ENDOPATH XCEL BLADELESS TROCARS WITH STABILITY SLEEVES: Brand: ENDOPATH XCEL

## (undated) DEVICE — CANNULA,OXY,ADULT,SUPERSOFT,W/7'TUB,UC: Brand: MEDLINE

## (undated) DEVICE — CONTAINER,SPECIMEN,OR STERILE,4OZ: Brand: MEDLINE

## (undated) DEVICE — GLV SURG SENSICARE W/ALOE PF LF 8 STRL

## (undated) DEVICE — GLV SURG SENSICARE MICRO PF LF 6 STRL

## (undated) DEVICE — 3M(TM) STERI-STRIP(TM) BLEND TONE SKIN CLOSURES (NON-REINFORCED) B1553: Brand: 3M™ STERI-STRIP™

## (undated) DEVICE — GLV SURG SENSICARE MICRO PF LF 8 STRL

## (undated) DEVICE — PAD STEEP TRENDELENBURG W/RAIL STRAP INTEGR ARM PROTECT WING

## (undated) DEVICE — PAD,NON-ADHERENT,2X3,STERILE,LF,1/PK: Brand: MEDLINE

## (undated) DEVICE — AIRWY 90MM NO9

## (undated) DEVICE — GLV SURG SENSICARE MICRO PF LF 7 STRL

## (undated) DEVICE — BNDG ADHS PLSTC 1X3IN LF

## (undated) DEVICE — ADAPT ST INFUS ADMIN SYR 70IN

## (undated) DEVICE — ENDOPOUCH RETRIEVER SPECIMEN RETRIEVAL BAGS: Brand: ENDOPOUCH RETRIEVER

## (undated) DEVICE — ENDOPATH XCEL UNIVERSAL TROCAR STABLILITY SLEEVES: Brand: ENDOPATH XCEL

## (undated) DEVICE — ADHS LIQ MASTISOL 2/3ML

## (undated) DEVICE — GLV SURG SENSICARE W/ALOE PF LF 8.5 STRL

## (undated) DEVICE — GLV SURG SENSICARE MICRO PF LF 6.5 STRL

## (undated) DEVICE — ENCORE® LATEX MICRO SIZE 6.5, STERILE LATEX POWDER-FREE SURGICAL GLOVE: Brand: ENCORE

## (undated) DEVICE — TOWEL,OR,DSP,ST,BLUE,STD,4/PK,20PK/CS: Brand: MEDLINE

## (undated) DEVICE — 3M™ STERI-STRIP™ REINFORCED ADHESIVE SKIN CLOSURES, R1547, 1/2 IN X 4 IN (12 MM X 100 MM), 6 STRIPS/ENVELOPE: Brand: 3M™ STERI-STRIP™

## (undated) DEVICE — GLV SURG SIGNATURE TOUCH PF LTX 6.5 STRL BX/50

## (undated) DEVICE — MEDI-VAC YANKAUER SUCTION HANDLE W/BULBOUS TIP: Brand: CARDINAL HEALTH

## (undated) DEVICE — ANTIBACTERIAL UNDYED BRAIDED (POLYGLACTIN 910), SYNTHETIC ABSORBABLE SUTURE: Brand: COATED VICRYL

## (undated) DEVICE — GLV SURG DERMASSURE GRN LF PF 7.0